# Patient Record
Sex: FEMALE | Race: WHITE | NOT HISPANIC OR LATINO
[De-identification: names, ages, dates, MRNs, and addresses within clinical notes are randomized per-mention and may not be internally consistent; named-entity substitution may affect disease eponyms.]

---

## 2017-02-08 ENCOUNTER — APPOINTMENT (OUTPATIENT)
Dept: INTERNAL MEDICINE | Facility: CLINIC | Age: 62
End: 2017-02-08

## 2017-02-08 ENCOUNTER — LABORATORY RESULT (OUTPATIENT)
Age: 62
End: 2017-02-08

## 2017-02-08 VITALS
TEMPERATURE: 97.8 F | HEART RATE: 63 BPM | WEIGHT: 95 LBS | BODY MASS INDEX: 18.65 KG/M2 | HEIGHT: 60 IN | SYSTOLIC BLOOD PRESSURE: 140 MMHG | OXYGEN SATURATION: 99 % | DIASTOLIC BLOOD PRESSURE: 88 MMHG

## 2017-02-08 DIAGNOSIS — A04.7 ENTEROCOLITIS DUE TO CLOSTRIDIUM DIFFICILE: ICD-10-CM

## 2017-02-08 RX ORDER — BACITRACIN 500 [USP'U]/G
500 OINTMENT OPHTHALMIC
Qty: 4 | Refills: 0 | Status: COMPLETED | COMMUNITY
Start: 2017-01-04

## 2017-02-08 RX ORDER — OFLOXACIN 3 MG/ML
0.3 SOLUTION/ DROPS OPHTHALMIC
Qty: 5 | Refills: 0 | Status: COMPLETED | COMMUNITY
Start: 2016-12-30

## 2017-02-08 RX ORDER — TOBRAMYCIN AND DEXAMETHASONE 3; 1 MG/ML; MG/ML
0.3-0.1 SUSPENSION/ DROPS OPHTHALMIC
Qty: 5 | Refills: 0 | Status: COMPLETED | COMMUNITY
Start: 2017-01-04

## 2017-02-11 ENCOUNTER — NON-APPOINTMENT (OUTPATIENT)
Age: 62
End: 2017-02-11

## 2017-02-11 PROBLEM — A04.7 CLOSTRIDIUM DIFFICILE COLITIS: Status: RESOLVED | Noted: 2017-02-11 | Resolved: 2017-02-11

## 2017-02-11 LAB
ALBUMIN SERPL ELPH-MCNC: 4.4 G/DL
ALP BLD-CCNC: 51 U/L
ALT SERPL-CCNC: 22 U/L
ANION GAP SERPL CALC-SCNC: 14 MMOL/L
AST SERPL-CCNC: 26 U/L
BASOPHILS # BLD AUTO: 0.04 K/UL
BASOPHILS NFR BLD AUTO: 0.9 %
BILIRUB SERPL-MCNC: 0.3 MG/DL
BUN SERPL-MCNC: 16 MG/DL
CALCIUM SERPL-MCNC: 9.6 MG/DL
CHLORIDE SERPL-SCNC: 101 MMOL/L
CO2 SERPL-SCNC: 25 MMOL/L
CREAT SERPL-MCNC: 0.81 MG/DL
EOSINOPHIL # BLD AUTO: 0.08 K/UL
EOSINOPHIL NFR BLD AUTO: 1.8 %
GLUCOSE SERPL-MCNC: 68 MG/DL
HCT VFR BLD CALC: 44.9 %
HGB BLD-MCNC: 14 G/DL
LYMPHOCYTES # BLD AUTO: 1.21 K/UL
LYMPHOCYTES NFR BLD AUTO: 28.2 %
MAN DIFF?: NORMAL
MCHC RBC-ENTMCNC: 31.2 GM/DL
MCHC RBC-ENTMCNC: 33 PG
MCV RBC AUTO: 105.9 FL
MONOCYTES # BLD AUTO: 0.31 K/UL
MONOCYTES NFR BLD AUTO: 7.3 %
NEUTROPHILS # BLD AUTO: 2.58 K/UL
NEUTROPHILS NFR BLD AUTO: 58.2 %
PLATELET # BLD AUTO: 269 K/UL
POTASSIUM SERPL-SCNC: 4.2 MMOL/L
PROT SERPL-MCNC: 7 G/DL
RBC # BLD: 4.24 M/UL
RBC # FLD: 14 %
SODIUM SERPL-SCNC: 140 MMOL/L
WBC # FLD AUTO: 4.3 K/UL

## 2017-02-13 ENCOUNTER — TRANSCRIPTION ENCOUNTER (OUTPATIENT)
Age: 62
End: 2017-02-13

## 2017-03-20 ENCOUNTER — MEDICATION RENEWAL (OUTPATIENT)
Age: 62
End: 2017-03-20

## 2017-03-26 ENCOUNTER — FORM ENCOUNTER (OUTPATIENT)
Age: 62
End: 2017-03-26

## 2017-03-27 ENCOUNTER — APPOINTMENT (OUTPATIENT)
Dept: MRI IMAGING | Facility: IMAGING CENTER | Age: 62
End: 2017-03-27

## 2017-03-27 ENCOUNTER — OUTPATIENT (OUTPATIENT)
Dept: OUTPATIENT SERVICES | Facility: HOSPITAL | Age: 62
LOS: 1 days | End: 2017-03-27
Payer: COMMERCIAL

## 2017-03-27 DIAGNOSIS — C50.919 MALIGNANT NEOPLASM OF UNSPECIFIED SITE OF UNSPECIFIED FEMALE BREAST: ICD-10-CM

## 2017-03-27 PROCEDURE — C8908: CPT

## 2017-03-27 PROCEDURE — 82565 ASSAY OF CREATININE: CPT

## 2017-03-27 PROCEDURE — A9585: CPT

## 2017-03-27 PROCEDURE — C8937: CPT

## 2017-04-18 ENCOUNTER — RESULT REVIEW (OUTPATIENT)
Age: 62
End: 2017-04-18

## 2017-06-23 ENCOUNTER — MEDICATION RENEWAL (OUTPATIENT)
Age: 62
End: 2017-06-23

## 2017-06-25 ENCOUNTER — RX RENEWAL (OUTPATIENT)
Age: 62
End: 2017-06-25

## 2017-08-02 ENCOUNTER — APPOINTMENT (OUTPATIENT)
Dept: INTERNAL MEDICINE | Facility: CLINIC | Age: 62
End: 2017-08-02
Payer: COMMERCIAL

## 2017-08-02 VITALS
DIASTOLIC BLOOD PRESSURE: 90 MMHG | BODY MASS INDEX: 18.65 KG/M2 | HEART RATE: 75 BPM | TEMPERATURE: 98 F | HEIGHT: 60 IN | OXYGEN SATURATION: 100 % | WEIGHT: 95 LBS | SYSTOLIC BLOOD PRESSURE: 130 MMHG

## 2017-08-02 PROCEDURE — 99213 OFFICE O/P EST LOW 20 MIN: CPT | Mod: 25

## 2017-08-02 PROCEDURE — 36415 COLL VENOUS BLD VENIPUNCTURE: CPT

## 2017-08-03 LAB
ALBUMIN SERPL ELPH-MCNC: 4.8 G/DL
ALP BLD-CCNC: 60 U/L
ALT SERPL-CCNC: 23 U/L
ANION GAP SERPL CALC-SCNC: 17 MMOL/L
AST SERPL-CCNC: 27 U/L
BASOPHILS # BLD AUTO: 0.03 K/UL
BASOPHILS NFR BLD AUTO: 0.7 %
BILIRUB SERPL-MCNC: 0.4 MG/DL
BUN SERPL-MCNC: 17 MG/DL
CALCIUM SERPL-MCNC: 10.3 MG/DL
CHLORIDE SERPL-SCNC: 103 MMOL/L
CO2 SERPL-SCNC: 25 MMOL/L
CREAT SERPL-MCNC: 0.86 MG/DL
EOSINOPHIL # BLD AUTO: 0.04 K/UL
EOSINOPHIL NFR BLD AUTO: 1 %
GLUCOSE SERPL-MCNC: 91 MG/DL
HBA1C MFR BLD HPLC: 5.6 %
HCT VFR BLD CALC: 44 %
HGB BLD-MCNC: 14.3 G/DL
IMM GRANULOCYTES NFR BLD AUTO: 0 %
LYMPHOCYTES # BLD AUTO: 1.14 K/UL
LYMPHOCYTES NFR BLD AUTO: 28 %
MAN DIFF?: NORMAL
MCHC RBC-ENTMCNC: 32.5 GM/DL
MCHC RBC-ENTMCNC: 33 PG
MCV RBC AUTO: 101.6 FL
MONOCYTES # BLD AUTO: 0.32 K/UL
MONOCYTES NFR BLD AUTO: 7.9 %
NEUTROPHILS # BLD AUTO: 2.54 K/UL
NEUTROPHILS NFR BLD AUTO: 62.4 %
PLATELET # BLD AUTO: 271 K/UL
POTASSIUM SERPL-SCNC: 4.2 MMOL/L
PROT SERPL-MCNC: 7.6 G/DL
RBC # BLD: 4.33 M/UL
RBC # FLD: 13.8 %
SODIUM SERPL-SCNC: 145 MMOL/L
T4 FREE SERPL-MCNC: 1.1 NG/DL
TSH SERPL-ACNC: 1.75 UIU/ML
VIT B12 SERPL-MCNC: 796 PG/ML
WBC # FLD AUTO: 4.07 K/UL

## 2017-10-29 ENCOUNTER — RX RENEWAL (OUTPATIENT)
Age: 62
End: 2017-10-29

## 2017-11-06 ENCOUNTER — OUTPATIENT (OUTPATIENT)
Dept: OUTPATIENT SERVICES | Facility: HOSPITAL | Age: 62
LOS: 1 days | Discharge: HOME | End: 2017-11-06

## 2017-11-06 DIAGNOSIS — C50.919 MALIGNANT NEOPLASM OF UNSPECIFIED SITE OF UNSPECIFIED FEMALE BREAST: ICD-10-CM

## 2017-11-08 ENCOUNTER — RESULT REVIEW (OUTPATIENT)
Age: 62
End: 2017-11-08

## 2017-11-08 ENCOUNTER — APPOINTMENT (OUTPATIENT)
Dept: HEMATOLOGY ONCOLOGY | Facility: CLINIC | Age: 62
End: 2017-11-08
Payer: COMMERCIAL

## 2017-11-08 ENCOUNTER — APPOINTMENT (OUTPATIENT)
Dept: INFUSION THERAPY | Facility: HOSPITAL | Age: 62
End: 2017-11-08
Payer: COMMERCIAL

## 2017-11-08 VITALS
HEART RATE: 68 BPM | DIASTOLIC BLOOD PRESSURE: 88 MMHG | WEIGHT: 94.8 LBS | TEMPERATURE: 97.4 F | RESPIRATION RATE: 16 BRPM | OXYGEN SATURATION: 95 % | SYSTOLIC BLOOD PRESSURE: 144 MMHG | BODY MASS INDEX: 18.51 KG/M2

## 2017-11-08 LAB
BASOPHILS # BLD AUTO: 0.1 K/UL — SIGNIFICANT CHANGE UP (ref 0–0.2)
BASOPHILS NFR BLD AUTO: 1.6 % — SIGNIFICANT CHANGE UP (ref 0–2)
EOSINOPHIL # BLD AUTO: 0.1 K/UL — SIGNIFICANT CHANGE UP (ref 0–0.5)
EOSINOPHIL NFR BLD AUTO: 1.3 % — SIGNIFICANT CHANGE UP (ref 0–6)
HCT VFR BLD CALC: 44.7 % — SIGNIFICANT CHANGE UP (ref 34.5–45)
HGB BLD-MCNC: 15.1 G/DL — SIGNIFICANT CHANGE UP (ref 11.5–15.5)
LYMPHOCYTES # BLD AUTO: 1.5 K/UL — SIGNIFICANT CHANGE UP (ref 1–3.3)
LYMPHOCYTES # BLD AUTO: 30.8 % — SIGNIFICANT CHANGE UP (ref 13–44)
MCHC RBC-ENTMCNC: 33.8 G/DL — SIGNIFICANT CHANGE UP (ref 32–36)
MCHC RBC-ENTMCNC: 34.5 PG — HIGH (ref 27–34)
MCV RBC AUTO: 102 FL — HIGH (ref 80–100)
MONOCYTES # BLD AUTO: 0.3 K/UL — SIGNIFICANT CHANGE UP (ref 0–0.9)
MONOCYTES NFR BLD AUTO: 6.2 % — SIGNIFICANT CHANGE UP (ref 2–14)
NEUTROPHILS # BLD AUTO: 2.9 K/UL — SIGNIFICANT CHANGE UP (ref 1.8–7.4)
NEUTROPHILS NFR BLD AUTO: 60.2 % — SIGNIFICANT CHANGE UP (ref 43–77)
PLATELET # BLD AUTO: 232 K/UL — SIGNIFICANT CHANGE UP (ref 150–400)
RBC # BLD: 4.39 M/UL — SIGNIFICANT CHANGE UP (ref 3.8–5.2)
RBC # FLD: 11.6 % — SIGNIFICANT CHANGE UP (ref 10.3–14.5)
WBC # BLD: 4.8 K/UL — SIGNIFICANT CHANGE UP (ref 3.8–10.5)
WBC # FLD AUTO: 4.8 K/UL — SIGNIFICANT CHANGE UP (ref 3.8–10.5)

## 2017-11-08 PROCEDURE — G0008: CPT

## 2017-11-08 PROCEDURE — 99214 OFFICE O/P EST MOD 30 MIN: CPT

## 2017-11-24 ENCOUNTER — APPOINTMENT (OUTPATIENT)
Dept: INTERNAL MEDICINE | Facility: CLINIC | Age: 62
End: 2017-11-24
Payer: COMMERCIAL

## 2017-11-24 ENCOUNTER — NON-APPOINTMENT (OUTPATIENT)
Age: 62
End: 2017-11-24

## 2017-11-24 VITALS
BODY MASS INDEX: 18.65 KG/M2 | WEIGHT: 95 LBS | DIASTOLIC BLOOD PRESSURE: 84 MMHG | HEART RATE: 73 BPM | OXYGEN SATURATION: 99 % | SYSTOLIC BLOOD PRESSURE: 98 MMHG | HEIGHT: 60 IN | TEMPERATURE: 97.9 F

## 2017-11-24 VITALS — SYSTOLIC BLOOD PRESSURE: 126 MMHG | DIASTOLIC BLOOD PRESSURE: 84 MMHG

## 2017-11-24 DIAGNOSIS — R51 HEADACHE: ICD-10-CM

## 2017-11-24 DIAGNOSIS — Z12.11 ENCOUNTER FOR SCREENING FOR MALIGNANT NEOPLASM OF COLON: ICD-10-CM

## 2017-11-24 DIAGNOSIS — Z87.898 PERSONAL HISTORY OF OTHER SPECIFIED CONDITIONS: ICD-10-CM

## 2017-11-24 PROCEDURE — 99396 PREV VISIT EST AGE 40-64: CPT | Mod: 25

## 2017-11-24 PROCEDURE — 36415 COLL VENOUS BLD VENIPUNCTURE: CPT

## 2017-11-24 PROCEDURE — 93000 ELECTROCARDIOGRAM COMPLETE: CPT

## 2017-11-24 RX ORDER — HYDROCORTISONE BUTYRATE 1 MG/G
0.1 CREAM TOPICAL
Qty: 15 | Refills: 0 | Status: DISCONTINUED | COMMUNITY
Start: 2017-11-07

## 2017-11-26 LAB
CHOLEST SERPL-MCNC: 249 MG/DL
CHOLEST/HDLC SERPL: 2.1 RATIO
HBA1C MFR BLD HPLC: 5.5 %
HCV AB SER QL: NONREACTIVE
HCV S/CO RATIO: 0.09 S/CO
HDLC SERPL-MCNC: 118 MG/DL
LDLC SERPL CALC-MCNC: 123 MG/DL
MAGNESIUM SERPL-MCNC: 2.1 MG/DL
T3FREE SERPL-MCNC: 2.3 PG/ML
T4 FREE SERPL-MCNC: 1 NG/DL
TRIGL SERPL-MCNC: 39 MG/DL
TSH SERPL-ACNC: 1.5 UIU/ML

## 2018-03-02 ENCOUNTER — APPOINTMENT (OUTPATIENT)
Dept: ORTHOPEDIC SURGERY | Facility: CLINIC | Age: 63
End: 2018-03-02
Payer: COMMERCIAL

## 2018-03-02 VITALS
HEART RATE: 72 BPM | SYSTOLIC BLOOD PRESSURE: 145 MMHG | HEIGHT: 60.5 IN | WEIGHT: 95 LBS | BODY MASS INDEX: 18.17 KG/M2 | DIASTOLIC BLOOD PRESSURE: 88 MMHG

## 2018-03-02 DIAGNOSIS — J18.9 PNEUMONIA, UNSPECIFIED ORGANISM: ICD-10-CM

## 2018-03-02 DIAGNOSIS — Z78.9 OTHER SPECIFIED HEALTH STATUS: ICD-10-CM

## 2018-03-02 PROCEDURE — 72100 X-RAY EXAM L-S SPINE 2/3 VWS: CPT

## 2018-03-02 PROCEDURE — 99204 OFFICE O/P NEW MOD 45 MIN: CPT

## 2018-03-05 PROBLEM — J18.9 COMMUNITY ACQUIRED PNEUMONIA: Status: RESOLVED | Noted: 2018-03-05 | Resolved: 2018-03-05

## 2018-03-05 PROBLEM — Z78.9 ALCOHOL INGESTION: Status: ACTIVE | Noted: 2018-03-05

## 2018-04-04 ENCOUNTER — APPOINTMENT (OUTPATIENT)
Dept: INTERNAL MEDICINE | Facility: CLINIC | Age: 63
End: 2018-04-04
Payer: COMMERCIAL

## 2018-04-04 ENCOUNTER — NON-APPOINTMENT (OUTPATIENT)
Age: 63
End: 2018-04-04

## 2018-04-04 VITALS
HEIGHT: 60.5 IN | TEMPERATURE: 97.5 F | SYSTOLIC BLOOD PRESSURE: 120 MMHG | WEIGHT: 95 LBS | OXYGEN SATURATION: 96 % | HEART RATE: 62 BPM | BODY MASS INDEX: 18.17 KG/M2 | DIASTOLIC BLOOD PRESSURE: 80 MMHG

## 2018-04-04 VITALS — SYSTOLIC BLOOD PRESSURE: 140 MMHG | DIASTOLIC BLOOD PRESSURE: 90 MMHG

## 2018-04-04 DIAGNOSIS — D75.89 OTHER SPECIFIED DISEASES OF BLOOD AND BLOOD-FORMING ORGANS: ICD-10-CM

## 2018-04-04 PROCEDURE — 93000 ELECTROCARDIOGRAM COMPLETE: CPT

## 2018-04-04 PROCEDURE — 99243 OFF/OP CNSLTJ NEW/EST LOW 30: CPT | Mod: 25

## 2018-04-04 PROCEDURE — 36415 COLL VENOUS BLD VENIPUNCTURE: CPT

## 2018-04-04 RX ORDER — MELOXICAM 15 MG/1
15 TABLET ORAL
Qty: 30 | Refills: 0 | Status: DISCONTINUED | COMMUNITY
Start: 2018-03-02 | End: 2018-04-04

## 2018-04-06 LAB
ALBUMIN SERPL ELPH-MCNC: 4.6 G/DL
ANION GAP SERPL CALC-SCNC: 14 MMOL/L
BACTERIA UR CULT: NORMAL
BASOPHILS # BLD AUTO: 0.03 K/UL
BASOPHILS NFR BLD AUTO: 0.8 %
BUN SERPL-MCNC: 15 MG/DL
CALCIUM SERPL-MCNC: 9.9 MG/DL
CHLORIDE SERPL-SCNC: 105 MMOL/L
CO2 SERPL-SCNC: 25 MMOL/L
CREAT SERPL-MCNC: 0.85 MG/DL
EOSINOPHIL # BLD AUTO: 0.06 K/UL
EOSINOPHIL NFR BLD AUTO: 1.6 %
GLUCOSE SERPL-MCNC: 71 MG/DL
HCT VFR BLD CALC: 43.2 %
HGB BLD-MCNC: 13.8 G/DL
IMM GRANULOCYTES NFR BLD AUTO: 0.3 %
LYMPHOCYTES # BLD AUTO: 1.09 K/UL
LYMPHOCYTES NFR BLD AUTO: 29.4 %
MAN DIFF?: NORMAL
MCHC RBC-ENTMCNC: 31.9 GM/DL
MCHC RBC-ENTMCNC: 33.1 PG
MCV RBC AUTO: 103.6 FL
MONOCYTES # BLD AUTO: 0.26 K/UL
MONOCYTES NFR BLD AUTO: 7 %
NEUTROPHILS # BLD AUTO: 2.26 K/UL
NEUTROPHILS NFR BLD AUTO: 60.9 %
PHOSPHATE SERPL-MCNC: 3.6 MG/DL
PLATELET # BLD AUTO: 271 K/UL
POTASSIUM SERPL-SCNC: 4.2 MMOL/L
RBC # BLD: 4.17 M/UL
RBC # FLD: 14.3 %
SODIUM SERPL-SCNC: 144 MMOL/L
WBC # FLD AUTO: 3.71 K/UL

## 2018-06-21 ENCOUNTER — RESULT REVIEW (OUTPATIENT)
Age: 63
End: 2018-06-21

## 2018-08-10 ENCOUNTER — MEDICATION RENEWAL (OUTPATIENT)
Age: 63
End: 2018-08-10

## 2018-08-26 ENCOUNTER — TRANSCRIPTION ENCOUNTER (OUTPATIENT)
Age: 63
End: 2018-08-26

## 2018-09-06 ENCOUNTER — RX RENEWAL (OUTPATIENT)
Age: 63
End: 2018-09-06

## 2018-09-18 ENCOUNTER — OUTPATIENT (OUTPATIENT)
Dept: OUTPATIENT SERVICES | Facility: HOSPITAL | Age: 63
LOS: 1 days | Discharge: HOME | End: 2018-09-18

## 2018-09-18 DIAGNOSIS — C50.919 MALIGNANT NEOPLASM OF UNSPECIFIED SITE OF UNSPECIFIED FEMALE BREAST: ICD-10-CM

## 2018-10-09 ENCOUNTER — APPOINTMENT (OUTPATIENT)
Dept: HEMATOLOGY ONCOLOGY | Facility: CLINIC | Age: 63
End: 2018-10-09

## 2018-10-12 ENCOUNTER — CHART COPY (OUTPATIENT)
Age: 63
End: 2018-10-12

## 2018-10-16 ENCOUNTER — APPOINTMENT (OUTPATIENT)
Dept: HEMATOLOGY ONCOLOGY | Facility: CLINIC | Age: 63
End: 2018-10-16

## 2018-10-16 ENCOUNTER — RESULT REVIEW (OUTPATIENT)
Age: 63
End: 2018-10-16

## 2018-10-16 LAB
BASOPHILS # BLD AUTO: 0.1 K/UL — SIGNIFICANT CHANGE UP (ref 0–0.2)
BASOPHILS NFR BLD AUTO: 1 % — SIGNIFICANT CHANGE UP (ref 0–2)
EOSINOPHIL # BLD AUTO: 0.1 K/UL — SIGNIFICANT CHANGE UP (ref 0–0.5)
EOSINOPHIL NFR BLD AUTO: 2.2 % — SIGNIFICANT CHANGE UP (ref 0–6)
HCT VFR BLD CALC: 43.7 % — SIGNIFICANT CHANGE UP (ref 34.5–45)
HGB BLD-MCNC: 14.6 G/DL — SIGNIFICANT CHANGE UP (ref 11.5–15.5)
LYMPHOCYTES # BLD AUTO: 1.6 K/UL — SIGNIFICANT CHANGE UP (ref 1–3.3)
LYMPHOCYTES # BLD AUTO: 30.7 % — SIGNIFICANT CHANGE UP (ref 13–44)
MCHC RBC-ENTMCNC: 33.4 G/DL — SIGNIFICANT CHANGE UP (ref 32–36)
MCHC RBC-ENTMCNC: 33.7 PG — SIGNIFICANT CHANGE UP (ref 27–34)
MCV RBC AUTO: 101 FL — HIGH (ref 80–100)
MONOCYTES # BLD AUTO: 0.3 K/UL — SIGNIFICANT CHANGE UP (ref 0–0.9)
MONOCYTES NFR BLD AUTO: 6.2 % — SIGNIFICANT CHANGE UP (ref 2–14)
NEUTROPHILS # BLD AUTO: 3.2 K/UL — SIGNIFICANT CHANGE UP (ref 1.8–7.4)
NEUTROPHILS NFR BLD AUTO: 59.9 % — SIGNIFICANT CHANGE UP (ref 43–77)
PLATELET # BLD AUTO: 256 K/UL — SIGNIFICANT CHANGE UP (ref 150–400)
RBC # BLD: 4.32 M/UL — SIGNIFICANT CHANGE UP (ref 3.8–5.2)
RBC # FLD: 11.6 % — SIGNIFICANT CHANGE UP (ref 10.3–14.5)
WBC # BLD: 5.3 K/UL — SIGNIFICANT CHANGE UP (ref 3.8–10.5)
WBC # FLD AUTO: 5.3 K/UL — SIGNIFICANT CHANGE UP (ref 3.8–10.5)

## 2018-10-17 ENCOUNTER — OUTPATIENT (OUTPATIENT)
Dept: OUTPATIENT SERVICES | Facility: HOSPITAL | Age: 63
LOS: 1 days | Discharge: HOME | End: 2018-10-17

## 2018-10-17 DIAGNOSIS — C50.919 MALIGNANT NEOPLASM OF UNSPECIFIED SITE OF UNSPECIFIED FEMALE BREAST: ICD-10-CM

## 2018-10-22 ENCOUNTER — APPOINTMENT (OUTPATIENT)
Dept: HEMATOLOGY ONCOLOGY | Facility: CLINIC | Age: 63
End: 2018-10-22
Payer: COMMERCIAL

## 2018-10-22 VITALS
HEART RATE: 72 BPM | BODY MASS INDEX: 18.25 KG/M2 | WEIGHT: 95 LBS | OXYGEN SATURATION: 100 % | SYSTOLIC BLOOD PRESSURE: 151 MMHG | TEMPERATURE: 97.8 F | DIASTOLIC BLOOD PRESSURE: 88 MMHG | RESPIRATION RATE: 16 BRPM

## 2018-10-22 PROCEDURE — 99214 OFFICE O/P EST MOD 30 MIN: CPT

## 2018-10-23 LAB — HBA1C MFR BLD HPLC: 5.7 %

## 2018-12-13 LAB
24R-OH-CALCIDIOL SERPL-MCNC: 65.5 PG/ML
ALBUMIN SERPL ELPH-MCNC: 4.7 G/DL
ALBUMIN SERPL ELPH-MCNC: 4.8 G/DL
ALP BLD-CCNC: 57 U/L
ALP BLD-CCNC: 59 U/L
ALT SERPL-CCNC: 21 U/L
ALT SERPL-CCNC: 25 U/L
ANION GAP SERPL CALC-SCNC: 14 MMOL/L
ANION GAP SERPL CALC-SCNC: 15 MMOL/L
APPEARANCE: CLEAR
AST SERPL-CCNC: 24 U/L
AST SERPL-CCNC: 26 U/L
BACTERIA UR CULT: ABNORMAL
BACTERIA: NEGATIVE
BILIRUB SERPL-MCNC: 0.2 MG/DL
BILIRUB SERPL-MCNC: 0.5 MG/DL
BILIRUBIN URINE: NEGATIVE
BLOOD URINE: NEGATIVE
BUN SERPL-MCNC: 16 MG/DL
BUN SERPL-MCNC: 24 MG/DL
CALCIUM SERPL-MCNC: 9.9 MG/DL
CALCIUM SERPL-MCNC: 9.9 MG/DL
CANCER AG125 SERPL-ACNC: 10 U/ML
CANCER AG27-29 SERPL-ACNC: 25.8 U/ML
CANCER AG27-29 SERPL-ACNC: 27 U/ML
CHLORIDE SERPL-SCNC: 101 MMOL/L
CHLORIDE SERPL-SCNC: 103 MMOL/L
CHOLEST SERPL-MCNC: 265 MG/DL
CHOLEST/HDLC SERPL: 2.3 RATIO
CO2 SERPL-SCNC: 25 MMOL/L
CO2 SERPL-SCNC: 27 MMOL/L
COLOR: YELLOW
CREAT SERPL-MCNC: 0.74 MG/DL
CREAT SERPL-MCNC: 0.92 MG/DL
GLUCOSE QUALITATIVE U: NEGATIVE MG/DL
GLUCOSE SERPL-MCNC: 80 MG/DL
GLUCOSE SERPL-MCNC: 96 MG/DL
HDLC SERPL-MCNC: 115 MG/DL
HYALINE CASTS: 0 /LPF
KETONES URINE: NEGATIVE
LDLC SERPL CALC-MCNC: 138 MG/DL
LEUKOCYTE ESTERASE URINE: NEGATIVE
MICROSCOPIC-UA: NORMAL
NITRITE URINE: NEGATIVE
PH URINE: 6.5
POTASSIUM SERPL-SCNC: 3.8 MMOL/L
POTASSIUM SERPL-SCNC: 4.4 MMOL/L
PROT SERPL-MCNC: 6.8 G/DL
PROT SERPL-MCNC: 7.2 G/DL
PROTEIN URINE: NEGATIVE MG/DL
RED BLOOD CELLS URINE: 2 /HPF
SODIUM SERPL-SCNC: 140 MMOL/L
SODIUM SERPL-SCNC: 145 MMOL/L
SPECIFIC GRAVITY URINE: 1.01
SQUAMOUS EPITHELIAL CELLS: 2 /HPF
TRIGL SERPL-MCNC: 59 MG/DL
UROBILINOGEN URINE: NEGATIVE MG/DL
WHITE BLOOD CELLS URINE: 2 /HPF

## 2019-01-23 ENCOUNTER — APPOINTMENT (OUTPATIENT)
Dept: HEMATOLOGY ONCOLOGY | Facility: CLINIC | Age: 64
End: 2019-01-23

## 2019-02-26 ENCOUNTER — FORM ENCOUNTER (OUTPATIENT)
Age: 64
End: 2019-02-26

## 2019-02-27 ENCOUNTER — OUTPATIENT (OUTPATIENT)
Dept: OUTPATIENT SERVICES | Facility: HOSPITAL | Age: 64
LOS: 1 days | End: 2019-02-27
Payer: COMMERCIAL

## 2019-02-27 ENCOUNTER — APPOINTMENT (OUTPATIENT)
Dept: MRI IMAGING | Facility: IMAGING CENTER | Age: 64
End: 2019-02-27
Payer: COMMERCIAL

## 2019-02-27 DIAGNOSIS — C50.919 MALIGNANT NEOPLASM OF UNSPECIFIED SITE OF UNSPECIFIED FEMALE BREAST: ICD-10-CM

## 2019-02-27 PROCEDURE — C8937: CPT

## 2019-02-27 PROCEDURE — A9585: CPT

## 2019-02-27 PROCEDURE — 77049 MRI BREAST C-+ W/CAD BI: CPT | Mod: 26

## 2019-02-27 PROCEDURE — C8908: CPT

## 2019-04-22 ENCOUNTER — NON-APPOINTMENT (OUTPATIENT)
Age: 64
End: 2019-04-22

## 2019-04-22 ENCOUNTER — APPOINTMENT (OUTPATIENT)
Dept: INTERNAL MEDICINE | Facility: CLINIC | Age: 64
End: 2019-04-22
Payer: COMMERCIAL

## 2019-04-22 VITALS — SYSTOLIC BLOOD PRESSURE: 140 MMHG | HEART RATE: 67 BPM | DIASTOLIC BLOOD PRESSURE: 76 MMHG | OXYGEN SATURATION: 99 %

## 2019-04-22 DIAGNOSIS — M54.41 LUMBAGO WITH SCIATICA, RIGHT SIDE: ICD-10-CM

## 2019-04-22 DIAGNOSIS — S33.5XXA SPRAIN OF LIGAMENTS OF LUMBAR SPINE, INITIAL ENCOUNTER: ICD-10-CM

## 2019-04-22 DIAGNOSIS — K21.9 GASTRO-ESOPHAGEAL REFLUX DISEASE W/OUT ESOPHAGITIS: ICD-10-CM

## 2019-04-22 DIAGNOSIS — K59.09 OTHER CONSTIPATION: ICD-10-CM

## 2019-04-22 DIAGNOSIS — R01.1 CARDIAC MURMUR, UNSPECIFIED: ICD-10-CM

## 2019-04-22 DIAGNOSIS — M54.16 RADICULOPATHY, LUMBAR REGION: ICD-10-CM

## 2019-04-22 DIAGNOSIS — Z11.59 ENCOUNTER FOR SCREENING FOR OTHER VIRAL DISEASES: ICD-10-CM

## 2019-04-22 LAB
APPEARANCE: CLEAR
BACTERIA: NEGATIVE
BILIRUBIN URINE: NEGATIVE
BLOOD URINE: NEGATIVE
COLOR: NORMAL
GLUCOSE QUALITATIVE U: NEGATIVE
HYALINE CASTS: 0 /LPF
KETONES URINE: NEGATIVE
LEUKOCYTE ESTERASE URINE: NEGATIVE
MICROSCOPIC-UA: NORMAL
NITRITE URINE: NEGATIVE
PH URINE: 7
PROTEIN URINE: NEGATIVE
RED BLOOD CELLS URINE: 0 /HPF
SPECIFIC GRAVITY URINE: 1.01
SQUAMOUS EPITHELIAL CELLS: 1 /HPF
UROBILINOGEN URINE: NORMAL
WHITE BLOOD CELLS URINE: 1 /HPF

## 2019-04-22 PROCEDURE — 93000 ELECTROCARDIOGRAM COMPLETE: CPT

## 2019-04-22 PROCEDURE — 99396 PREV VISIT EST AGE 40-64: CPT | Mod: 25

## 2019-04-23 PROBLEM — Z11.59 NEED FOR HEPATITIS C SCREENING TEST: Status: RESOLVED | Noted: 2017-11-24 | Resolved: 2019-04-23

## 2019-04-23 PROBLEM — M54.16 LUMBAR RADICULOPATHY: Status: RESOLVED | Noted: 2018-03-02 | Resolved: 2019-04-23

## 2019-04-23 PROBLEM — S33.5XXA LUMBAR SPRAIN, INITIAL ENCOUNTER: Status: RESOLVED | Noted: 2018-03-02 | Resolved: 2019-04-23

## 2019-04-23 NOTE — HEALTH RISK ASSESSMENT
[Patient declined mammogram] : Patient declined mammogram [Patient reported PAP Smear was normal] : Patient reported PAP Smear was normal [Patient declined bone density test] : Patient declined bone density test [None] : None [With Family] : lives with family [Employed] : employed [College] : College [] :  [# Of Children ___] : has [unfilled] children [Sexually Active] : sexually active [Feels Safe at Home] : Feels safe at home [Fully functional (bathing, dressing, toileting, transferring, walking, feeding)] : Fully functional (bathing, dressing, toileting, transferring, walking, feeding) [Smoke Detector] : smoke detector [Carbon Monoxide Detector] : carbon monoxide detector [Seat Belt] :  uses seat belt [Sunscreen] : uses sunscreen [No falls in past year] : Patient reported no falls in the past year [0] : 2) Feeling down, depressed, or hopeless: Not at all (0) [Fully functional (using the telephone, shopping, preparing meals, housekeeping, doing laundry, using] : Fully functional and needs no help or supervision to perform IADLs (using the telephone, shopping, preparing meals, housekeeping, doing laundry, using transportation, managing medications and managing finances) [Reports normal functional visual acuity (ie: able to read med bottle)] : Reports normal functional visual acuity [] : No [de-identified] : wine 5 days a week [de-identified] : 5days a week bar class [de-identified] : eats a healthy diet With adequate calcium [[id number]] : 0 [Language] : denies difficulty with language [Change in mental status noted] : No change in mental status noted [Behavior] : denies difficulty with behavior [Learning/Retaining New Information] : denies difficulty learning/retaining new information [Handling Complex Tasks] : denies difficulty handling complex tasks [Reasoning] : denies difficulty with reasoning [Spatial Ability and Orientation] : denies difficulty with spatial ability and orientation [Reports changes in vision] : Reports no changes in vision [High Risk Behavior] : no high risk behavior [Reports changes in hearing] : Reports no changes in hearing [Reports changes in dental health] : Reports no changes in dental health [Guns at Home] : no guns at home [PapSmearDate] : 2018 [MammogramComments] : bilateral mastectomy [ColonoscopyComments] : no polyps [ColonoscopyDate] : 8/2012 [HepatitisCDate] : 11/2017 [HepatitisCComments] : nonreactive

## 2019-04-23 NOTE — PHYSICAL EXAM
[No Acute Distress] : no acute distress [Well Nourished] : well nourished [Well Developed] : well developed [Well-Appearing] : well-appearing [Normal Voice/Communication] : normal voice/communication [Normal Sclera/Conjunctiva] : normal sclera/conjunctiva [PERRL] : pupils equal round and reactive to light [EOMI] : extraocular movements intact [Normal Outer Ear/Nose] : the outer ears and nose were normal in appearance [Normal Oropharynx] : the oropharynx was normal [Normal TMs] : both tympanic membranes were normal [No JVD] : no jugular venous distention [Supple] : supple [No Lymphadenopathy] : no lymphadenopathy [Thyroid Normal, No Nodules] : the thyroid was normal and there were no nodules present [No Respiratory Distress] : no respiratory distress  [Clear to Auscultation] : lungs were clear to auscultation bilaterally [No Accessory Muscle Use] : no accessory muscle use [Normal Percussion] : the chest was normal to percussion [Normal Rate] : normal rate  [Normal S1, S2] : normal S1 and S2 [Regular Rhythm] : with a regular rhythm [No Murmur] : no murmur heard [No Carotid Bruits] : no carotid bruits [No Abdominal Bruit] : a ~M bruit was not heard ~T in the abdomen [No Varicosities] : no varicosities [Pedal Pulses Present] : the pedal pulses are present [No Edema] : there was no peripheral edema [No Palpable Aorta] : no palpable aorta [No Extremity Clubbing/Cyanosis] : no extremity clubbing/cyanosis [Soft] : abdomen soft [Non Tender] : non-tender [Non-distended] : non-distended [No Masses] : no abdominal mass palpated [No HSM] : no HSM [Normal Bowel Sounds] : normal bowel sounds [Normal Axillary Nodes] : no axillary lymphadenopathy [Normal Supraclavicular Nodes] : no supraclavicular lymphadenopathy [Normal Posterior Cervical Nodes] : no posterior cervical lymphadenopathy [Normal Anterior Cervical Nodes] : no anterior cervical lymphadenopathy [Normal Inguinal Nodes] : no inguinal lymphadenopathy [No CVA Tenderness] : no CVA  tenderness [No Spinal Tenderness] : no spinal tenderness [No Joint Swelling] : no joint swelling [Grossly Normal Strength/Tone] : grossly normal strength/tone [No Rash] : no rash [No Skin Lesions] : no skin lesions [Normal Gait] : normal gait [Coordination Grossly Intact] : coordination grossly intact [Speech Grossly Normal] : speech grossly normal [No Focal Deficits] : no focal deficits [Memory Grossly Normal] : memory grossly normal [Normal Affect] : the affect was normal [Alert and Oriented x3] : oriented to person, place, and time [Normal Mood] : the mood was normal [Normal Insight/Judgement] : insight and judgment were intact [Deep Tendon Reflexes (DTR)] : deep tendon reflexes were 2+ and symmetric [Kyphosis] : no kyphosis [de-identified] : thin [Scoliosis] : no scoliosis [de-identified] : Chalazion left lateral upper eyelid and hordeum  left lower eyelid [de-identified] : Status post bilateral mastectomy with implant reconstruction [de-identified] : Harsh 3/6 systolic murmur at left lower sternal border

## 2019-04-23 NOTE — ASSESSMENT
[FreeTextEntry1] : Per patient request she will return for fasting blood work. A urinalysis was sent today.\par She will follow up  with ophthalmology and advised continuing warm soaks to her left eye for a chalazion and hordeolum. She will use her antibiotic eyedrops.\par She will followup with reconstructive breast surgery regarding her implants and cancer risk.\par She refuses to consider a bone density or any treatment for osteoporosis.  she is aware of fracture risk and consequences\par She has an impressive  systolic ejection murmur at the left lower sternal border. With her breast implants I am concerned about doing an echo and getting good results without harming implants. I have advised she see cardiology for evaluation.\par She was advised to continue hydration to prevent kidney stones and also to followup with urology\par She will do a stool FIT test pending an eventual colonoscopy when she has her other medical issues straightened out\par We discussed the Shingrix  vaccine and she will return when it is available

## 2019-04-23 NOTE — HISTORY OF PRESENT ILLNESS
[FreeTextEntry1] : CPE [de-identified] : She has had a stressful year. She was told of the possibility of her textured breast implants causing cancer and has a difficult decision as to whether to get them removed as the surgeon told her it could be issues because of prior radiation and skin issues. She has had issues with her  mother and daughter's health. She continues to work full-time. She is presently suffering with a chalazion of the left eye and saw the eye doctor Dr. Gomez  and is using warm soaks and  Tobrex drops. Now she feels she might be getting a stye  on the lower left lid.  . Her vision is fine. She is exercising regularly on the treadmill for 20 minutes and does bar  classes 5 times a week. She does this without chest pain shortness of breath palpitations.  She has no edema.  She is living with chronic constipation.  She has rare heartburn. She sees the gynecologist yearly. She has seen the dermatologist and all was fine.  she has had no issues related to kidney stones.  She tries to hydrate well.  She has been noncompliant with urology follow up.  Her back has been fine.

## 2019-04-23 NOTE — DATA REVIEWED
[FreeTextEntry1] : EKG reveals normal sinus with a diffuse T wave inversion no change compared to prior

## 2019-05-14 ENCOUNTER — APPOINTMENT (OUTPATIENT)
Dept: ORTHOPEDIC SURGERY | Facility: CLINIC | Age: 64
End: 2019-05-14
Payer: COMMERCIAL

## 2019-05-14 VITALS
HEART RATE: 79 BPM | SYSTOLIC BLOOD PRESSURE: 153 MMHG | WEIGHT: 95 LBS | BODY MASS INDEX: 17.94 KG/M2 | DIASTOLIC BLOOD PRESSURE: 97 MMHG | HEIGHT: 61 IN

## 2019-05-14 DIAGNOSIS — M47.812 SPONDYLOSIS W/OUT MYELOPATHY OR RADICULOPATHY, CERVICAL REGION: ICD-10-CM

## 2019-05-14 DIAGNOSIS — M54.2 CERVICALGIA: ICD-10-CM

## 2019-05-14 PROCEDURE — 72040 X-RAY EXAM NECK SPINE 2-3 VW: CPT

## 2019-05-14 PROCEDURE — 99214 OFFICE O/P EST MOD 30 MIN: CPT

## 2019-05-14 NOTE — REASON FOR VISIT
[Follow-Up Visit] : a follow-up visit for [Degenerative Joint Disease] : degenerative joint disease [Neck Pain] : neck pain

## 2019-05-14 NOTE — HISTORY OF PRESENT ILLNESS
[de-identified] : She has a long history of neck pain and headaches. Her neck pain at the cervicothoracic junction has increased in the last month or so. She goes to an exercise class regularly but uses only 2 pound weights. She has not had associated neurologic symptoms and there is no radiation of the pain to her arms. There have been no changes in her gait.

## 2019-05-14 NOTE — DISCUSSION/SUMMARY
[de-identified] : She has degenerative changes in the cervical spine. She will continue use moist heat. We discussed activities she needs to avoid which may aggravate his symptoms. I have again recommended meloxicam but she will not take any medication. She has not had a bone density and more than 10 years and I stressed the necessity to find out where she is at and if her bone density is abnormal it needs to be addressed. I will see her for followup on a p.r.n. basis

## 2019-05-14 NOTE — PHYSICAL EXAM
[de-identified] : She is fully alert and oriented with a normal mood and affect. She is in no acute distress. She is quite . Her lower extremity neurologic exam revealed 1-2+ symmetrical reflexes with normal motor power. With Babinski there was withdrawal. Straight leg raising is negative to 90°. Her upper extremity neurological exam revealed 1-2+ symmetrical reflexes were normal motor power and sensation. There is a loss of range of motion of the left shoulder with some pain at the extremes. There is some tenderness of the spinous processes at and below the cervicothoracic junction. [de-identified] : AP and lateral x-rays of the cervical spine reveals significant multilevel degenerative changes. There are no destructive changes. There is a mild scoliosis.

## 2019-05-17 ENCOUNTER — TRANSCRIPTION ENCOUNTER (OUTPATIENT)
Age: 64
End: 2019-05-17

## 2019-05-18 LAB — HEMOCCULT STL QL IA: NEGATIVE

## 2019-06-26 ENCOUNTER — OUTPATIENT (OUTPATIENT)
Dept: OUTPATIENT SERVICES | Facility: HOSPITAL | Age: 64
LOS: 1 days | End: 2019-06-26
Payer: COMMERCIAL

## 2019-06-26 ENCOUNTER — APPOINTMENT (OUTPATIENT)
Dept: RADIOLOGY | Facility: IMAGING CENTER | Age: 64
End: 2019-06-26
Payer: COMMERCIAL

## 2019-06-26 DIAGNOSIS — Z00.00 ENCOUNTER FOR GENERAL ADULT MEDICAL EXAMINATION WITHOUT ABNORMAL FINDINGS: ICD-10-CM

## 2019-06-26 PROCEDURE — 77080 DXA BONE DENSITY AXIAL: CPT | Mod: 26

## 2019-06-26 PROCEDURE — 77080 DXA BONE DENSITY AXIAL: CPT

## 2019-06-28 ENCOUNTER — RX RENEWAL (OUTPATIENT)
Age: 64
End: 2019-06-28

## 2019-07-12 ENCOUNTER — APPOINTMENT (OUTPATIENT)
Dept: INTERNAL MEDICINE | Facility: CLINIC | Age: 64
End: 2019-07-12
Payer: COMMERCIAL

## 2019-07-12 VITALS
SYSTOLIC BLOOD PRESSURE: 122 MMHG | BODY MASS INDEX: 18.65 KG/M2 | TEMPERATURE: 97.8 F | DIASTOLIC BLOOD PRESSURE: 80 MMHG | WEIGHT: 95 LBS | HEIGHT: 60 IN | OXYGEN SATURATION: 99 % | HEART RATE: 75 BPM

## 2019-07-12 VITALS — SYSTOLIC BLOOD PRESSURE: 130 MMHG | DIASTOLIC BLOOD PRESSURE: 80 MMHG

## 2019-07-12 DIAGNOSIS — F41.8 OTHER SPECIFIED ANXIETY DISORDERS: ICD-10-CM

## 2019-07-12 DIAGNOSIS — Z98.82 BREAST IMPLANT STATUS: ICD-10-CM

## 2019-07-12 DIAGNOSIS — R03.0 ELEVATED BLOOD-PRESSURE READING, W/OUT DIAGNOSIS OF HYPERTENSION: ICD-10-CM

## 2019-07-12 PROCEDURE — 99214 OFFICE O/P EST MOD 30 MIN: CPT

## 2019-07-12 NOTE — ASSESSMENT
[FreeTextEntry1] : Patient was reassured that her blood pressure appears to be normal here. I feel her machine is inaccurate as it is reading 20 points higher both systolic and diastolic and the cuff  she is using is too big as well. She will return here 2 or 3 times over the next month to get repeat blood pressure check. I do not think there is any evidence of pheochromocytoma.\par Regarding her osteoporosis we discussed the options. She does not appear to be a candidate for Forteo/Tymlos as she has had radiation therapy . We discussed Prolia, Reclast and  oral bisphosphonates. We discussed possible side effects.   She was given a referral to endocrinology to further discuss.\par She was also given a referral to nephrology regarding her kidney stones.\par Emotional support was given

## 2019-07-12 NOTE — PHYSICAL EXAM
[No Acute Distress] : no acute distress [Well Nourished] : well nourished [Well Developed] : well developed [Well-Appearing] : well-appearing [Normal Sclera/Conjunctiva] : normal sclera/conjunctiva [PERRL] : pupils equal round and reactive to light [EOMI] : extraocular movements intact [Normal Outer Ear/Nose] : the outer ears and nose were normal in appearance [Normal Oropharynx] : the oropharynx was normal [No JVD] : no jugular venous distention [No Lymphadenopathy] : no lymphadenopathy [Supple] : supple [Thyroid Normal, No Nodules] : the thyroid was normal and there were no nodules present [No Respiratory Distress] : no respiratory distress  [No Accessory Muscle Use] : no accessory muscle use [Clear to Auscultation] : lungs were clear to auscultation bilaterally [Normal Rate] : normal rate  [Regular Rhythm] : with a regular rhythm [Normal S1, S2] : normal S1 and S2 [No Murmur] : no murmur heard [No Carotid Bruits] : no carotid bruits [No Abdominal Bruit] : a ~M bruit was not heard ~T in the abdomen [Pedal Pulses Present] : the pedal pulses are present [No Edema] : there was no peripheral edema [No Palpable Aorta] : no palpable aorta [Soft] : abdomen soft [Non Tender] : non-tender [Non-distended] : non-distended [No Masses] : no abdominal mass palpated [No HSM] : no HSM [Normal Bowel Sounds] : normal bowel sounds [Normal Posterior Cervical Nodes] : no posterior cervical lymphadenopathy [Normal Anterior Cervical Nodes] : no anterior cervical lymphadenopathy [No CVA Tenderness] : no CVA  tenderness [No Spinal Tenderness] : no spinal tenderness [No Joint Swelling] : no joint swelling [Grossly Normal Strength/Tone] : grossly normal strength/tone [No Rash] : no rash [Normal Insight/Judgement] : insight and judgment were intact [Normal Affect] : the affect was normal [Normal Voice/Communication] : normal voice/communication [Speech Grossly Normal] : speech grossly normal [Normal Mood] : the mood was normal

## 2019-07-12 NOTE — DATA REVIEWED
[FreeTextEntry1] : Bone density  6/26/19....osteoporosis  T score -3.4 femoral neck, T score -3.1 spine

## 2019-07-12 NOTE — HISTORY OF PRESENT ILLNESS
[FreeTextEntry1] : Elevated blood pressure\par Osteoporosis\par Kidney stones [de-identified] : She has been monitoring her blood pressure at home and she has been getting elevated readings sometimes in the 130s to 140s sometimes 170 and the diastolics in the 100's. She has not  been feeling well at times getting headaches at times feeling a little dizzy. She doesn't relax for several minutes before taking her pressure. When she went on vacation she felt perfectly fine with no headaches or dizziness .  She is admittedly under a lot of stress from family members. She also is anxious over her own health issues. "I am not a well person". She brings up the fact that she's had kidney stones. She brings up the issues regarding her textured breast implants and she is still worried about their presence. She was told by the surgeon that surgery would be very complicated due to  the fact that she has had radiation and has thin skin.  She has no palpitations no flushing.  She's been seeing a nutritionist who has her on several herbal preparations. She does not take any NSAIDs, excess salt or decongestants\par She had  a bone density by her gynecologist which showed osteoporosis and she wants me to review the test.  She states her blood pressure was normal at the gynecologist. \par

## 2019-08-01 ENCOUNTER — OUTPATIENT (OUTPATIENT)
Dept: OUTPATIENT SERVICES | Facility: HOSPITAL | Age: 64
LOS: 1 days | Discharge: HOME | End: 2019-08-01

## 2019-08-01 DIAGNOSIS — C50.919 MALIGNANT NEOPLASM OF UNSPECIFIED SITE OF UNSPECIFIED FEMALE BREAST: ICD-10-CM

## 2019-08-03 ENCOUNTER — APPOINTMENT (OUTPATIENT)
Dept: ULTRASOUND IMAGING | Facility: IMAGING CENTER | Age: 64
End: 2019-08-03
Payer: COMMERCIAL

## 2019-08-03 ENCOUNTER — APPOINTMENT (OUTPATIENT)
Dept: RADIOLOGY | Facility: IMAGING CENTER | Age: 64
End: 2019-08-03
Payer: COMMERCIAL

## 2019-08-03 ENCOUNTER — OUTPATIENT (OUTPATIENT)
Dept: OUTPATIENT SERVICES | Facility: HOSPITAL | Age: 64
LOS: 1 days | End: 2019-08-03
Payer: COMMERCIAL

## 2019-08-03 DIAGNOSIS — Z00.8 ENCOUNTER FOR OTHER GENERAL EXAMINATION: ICD-10-CM

## 2019-08-03 PROCEDURE — 74018 RADEX ABDOMEN 1 VIEW: CPT | Mod: 26

## 2019-08-03 PROCEDURE — 74018 RADEX ABDOMEN 1 VIEW: CPT

## 2019-08-03 PROCEDURE — 76775 US EXAM ABDO BACK WALL LIM: CPT | Mod: 26

## 2019-08-03 PROCEDURE — 76775 US EXAM ABDO BACK WALL LIM: CPT

## 2019-08-05 ENCOUNTER — APPOINTMENT (OUTPATIENT)
Dept: HEMATOLOGY ONCOLOGY | Facility: CLINIC | Age: 64
End: 2019-08-05
Payer: COMMERCIAL

## 2019-08-05 VITALS
DIASTOLIC BLOOD PRESSURE: 91 MMHG | RESPIRATION RATE: 16 BRPM | OXYGEN SATURATION: 99 % | HEART RATE: 69 BPM | SYSTOLIC BLOOD PRESSURE: 159 MMHG | TEMPERATURE: 97.3 F

## 2019-08-05 PROCEDURE — 99213 OFFICE O/P EST LOW 20 MIN: CPT

## 2019-08-05 NOTE — HISTORY OF PRESENT ILLNESS
[de-identified] : This is a 60-year-old woman with history of breast carcinoma. The patient has undergone bilateral mastectomies for early stage breast carcinoma T1 B. and also received chemotherapy with CMF treatment. She decided not to take hormonal treatment even though her tumors were ER positive. She continues to do well and has had no evidence of recurrence of her disease. [de-identified] : Since the last vis it the pt was joshua osuna she has pleated implant and is at risk for developing rare type NHL. Pt was told reoperation would be difficult.

## 2019-08-05 NOTE — ASSESSMENT
[FreeTextEntry1] : Pt to continue surveillance for recurrence of her breast ca. pt had MRI of breast for chest and impplant pain and was neg with no rupture. Pt to consider repeat reconstruction but was told due to thin skin that healing would be difficult Pt to hold surgery and be monitored. [Curative] : Goals of care discussed with patient: Curative [Palliative Care Plan] : not applicable at this time

## 2019-08-05 NOTE — PHYSICAL EXAM
[Restricted in physically strenuous activity but ambulatory and able to carry out work of a light or sedentary nature] : Status 1- Restricted in physically strenuous activity but ambulatory and able to carry out work of a light or sedentary nature, e.g., light house work, office work [Normal] : affect appropriate [de-identified] : Bilateral mastectomies and reconstruction.

## 2019-09-05 ENCOUNTER — OUTPATIENT (OUTPATIENT)
Dept: OUTPATIENT SERVICES | Facility: HOSPITAL | Age: 64
LOS: 1 days | Discharge: HOME | End: 2019-09-05

## 2019-09-05 DIAGNOSIS — C50.919 MALIGNANT NEOPLASM OF UNSPECIFIED SITE OF UNSPECIFIED FEMALE BREAST: ICD-10-CM

## 2019-09-09 ENCOUNTER — RESULT REVIEW (OUTPATIENT)
Age: 64
End: 2019-09-09

## 2019-09-09 ENCOUNTER — APPOINTMENT (OUTPATIENT)
Dept: HEMATOLOGY ONCOLOGY | Facility: CLINIC | Age: 64
End: 2019-09-09
Payer: COMMERCIAL

## 2019-09-09 VITALS
HEART RATE: 71 BPM | WEIGHT: 95 LBS | OXYGEN SATURATION: 100 % | SYSTOLIC BLOOD PRESSURE: 160 MMHG | TEMPERATURE: 97.8 F | RESPIRATION RATE: 18 BRPM | BODY MASS INDEX: 18.55 KG/M2 | DIASTOLIC BLOOD PRESSURE: 95 MMHG

## 2019-09-09 DIAGNOSIS — Z80.8 FAMILY HISTORY OF MALIGNANT NEOPLASM OF OTHER ORGANS OR SYSTEMS: ICD-10-CM

## 2019-09-09 DIAGNOSIS — Z80.7 FAMILY HISTORY OF OTHER MALIGNANT NEOPLASMS OF LYMPHOID, HEMATOPOIETIC AND RELATED TISSUES: ICD-10-CM

## 2019-09-09 DIAGNOSIS — Z80.6 FAMILY HISTORY OF LEUKEMIA: ICD-10-CM

## 2019-09-09 DIAGNOSIS — Z80.1 FAMILY HISTORY OF MALIGNANT NEOPLASM OF TRACHEA, BRONCHUS AND LUNG: ICD-10-CM

## 2019-09-09 LAB
BASOPHILS # BLD AUTO: 0.1 K/UL — SIGNIFICANT CHANGE UP (ref 0–0.2)
BASOPHILS NFR BLD AUTO: 1.1 % — SIGNIFICANT CHANGE UP (ref 0–2)
EOSINOPHIL # BLD AUTO: 0.1 K/UL — SIGNIFICANT CHANGE UP (ref 0–0.5)
EOSINOPHIL NFR BLD AUTO: 1.4 % — SIGNIFICANT CHANGE UP (ref 0–6)
HCT VFR BLD CALC: 41.5 % — SIGNIFICANT CHANGE UP (ref 34.5–45)
HGB BLD-MCNC: 13.6 G/DL — SIGNIFICANT CHANGE UP (ref 11.5–15.5)
LYMPHOCYTES # BLD AUTO: 1.5 K/UL — SIGNIFICANT CHANGE UP (ref 1–3.3)
LYMPHOCYTES # BLD AUTO: 28.8 % — SIGNIFICANT CHANGE UP (ref 13–44)
MCHC RBC-ENTMCNC: 32.7 G/DL — SIGNIFICANT CHANGE UP (ref 32–36)
MCHC RBC-ENTMCNC: 33.5 PG — SIGNIFICANT CHANGE UP (ref 27–34)
MCV RBC AUTO: 103 FL — HIGH (ref 80–100)
MONOCYTES # BLD AUTO: 0.4 K/UL — SIGNIFICANT CHANGE UP (ref 0–0.9)
MONOCYTES NFR BLD AUTO: 8.6 % — SIGNIFICANT CHANGE UP (ref 2–14)
NEUTROPHILS # BLD AUTO: 3.1 K/UL — SIGNIFICANT CHANGE UP (ref 1.8–7.4)
NEUTROPHILS NFR BLD AUTO: 60.1 % — SIGNIFICANT CHANGE UP (ref 43–77)
PLATELET # BLD AUTO: 235 K/UL — SIGNIFICANT CHANGE UP (ref 150–400)
RBC # BLD: 4.05 M/UL — SIGNIFICANT CHANGE UP (ref 3.8–5.2)
RBC # FLD: 11.5 % — SIGNIFICANT CHANGE UP (ref 10.3–14.5)
WBC # BLD: 5.2 K/UL — SIGNIFICANT CHANGE UP (ref 3.8–10.5)
WBC # FLD AUTO: 5.2 K/UL — SIGNIFICANT CHANGE UP (ref 3.8–10.5)

## 2019-09-09 PROCEDURE — 99213 OFFICE O/P EST LOW 20 MIN: CPT

## 2019-09-10 PROBLEM — Z80.7 FAMILY HISTORY OF HODGKIN'S LYMPHOMA: Status: ACTIVE | Noted: 2019-09-09

## 2019-09-10 PROBLEM — Z80.1 FAMILY HISTORY OF LUNG CANCER: Status: ACTIVE | Noted: 2019-09-09

## 2019-09-10 PROBLEM — Z80.6 FAMILY HISTORY OF LEUKEMIA: Status: ACTIVE | Noted: 2019-09-09

## 2019-09-12 LAB
ALBUMIN SERPL ELPH-MCNC: 4.5 G/DL
ALP BLD-CCNC: 57 U/L
ALT SERPL-CCNC: 25 U/L
ANION GAP SERPL CALC-SCNC: 15 MMOL/L
AST SERPL-CCNC: 27 U/L
BILIRUB SERPL-MCNC: 0.3 MG/DL
BUN SERPL-MCNC: 20 MG/DL
CALCIUM SERPL-MCNC: 9.5 MG/DL
CHLORIDE SERPL-SCNC: 104 MMOL/L
CO2 SERPL-SCNC: 23 MMOL/L
CREAT SERPL-MCNC: 0.72 MG/DL
GLUCOSE SERPL-MCNC: 88 MG/DL
POTASSIUM SERPL-SCNC: 4.2 MMOL/L
PROT SERPL-MCNC: 6.5 G/DL
SODIUM SERPL-SCNC: 142 MMOL/L

## 2019-09-16 ENCOUNTER — APPOINTMENT (OUTPATIENT)
Dept: ULTRASOUND IMAGING | Facility: IMAGING CENTER | Age: 64
End: 2019-09-16
Payer: COMMERCIAL

## 2019-09-16 ENCOUNTER — OUTPATIENT (OUTPATIENT)
Dept: OUTPATIENT SERVICES | Facility: HOSPITAL | Age: 64
LOS: 1 days | End: 2019-09-16
Payer: COMMERCIAL

## 2019-09-16 DIAGNOSIS — Z00.8 ENCOUNTER FOR OTHER GENERAL EXAMINATION: ICD-10-CM

## 2019-09-16 PROCEDURE — 76830 TRANSVAGINAL US NON-OB: CPT

## 2019-09-16 PROCEDURE — 76856 US EXAM PELVIC COMPLETE: CPT | Mod: 26,59

## 2019-09-16 PROCEDURE — 76830 TRANSVAGINAL US NON-OB: CPT | Mod: 26

## 2019-09-16 PROCEDURE — 76856 US EXAM PELVIC COMPLETE: CPT

## 2019-10-06 NOTE — HISTORY OF PRESENT ILLNESS
[de-identified] : This is a 60-year-old woman with history of breast carcinoma. The patient has undergone bilateral mastectomies for early stage breast carcinoma T1 B. and also received chemotherapy with CMF treatment. She decided not to take hormonal treatment even though her tumors were ER positive. She continues to do well and has had no evidence of recurrence of her disease. [de-identified] : Patient is here for genetic testing. She is without any new medical complaints. Attending Attestation (For Attendings USE Only)...

## 2019-10-06 NOTE — ASSESSMENT
[Curative] : Goals of care discussed with patient: Curative [Palliative Care Plan] : not applicable at this time [FreeTextEntry1] : Pt to continue surveillance for recurrence of her breast ca. pt had MRI of breast for chest and implant pain and was neg with no rupture. Pt to consider repeat reconstruction but was told due to thin skin that healing would be difficult Pt to hold surgery and be monitored. Genetic test done, f/u. RTO in 1 year.

## 2019-10-06 NOTE — PHYSICAL EXAM
[Restricted in physically strenuous activity but ambulatory and able to carry out work of a light or sedentary nature] : Status 1- Restricted in physically strenuous activity but ambulatory and able to carry out work of a light or sedentary nature, e.g., light house work, office work [Normal] : affect appropriate [de-identified] : Bilateral mastectomies and reconstruction.

## 2019-11-15 LAB
MISCELLANEOUS TEST: NORMAL
PROC NAME: NORMAL

## 2020-01-04 ENCOUNTER — OUTPATIENT (OUTPATIENT)
Dept: OUTPATIENT SERVICES | Facility: HOSPITAL | Age: 65
LOS: 1 days | Discharge: HOME | End: 2020-01-04

## 2020-01-04 DIAGNOSIS — C50.919 MALIGNANT NEOPLASM OF UNSPECIFIED SITE OF UNSPECIFIED FEMALE BREAST: ICD-10-CM

## 2020-01-07 ENCOUNTER — APPOINTMENT (OUTPATIENT)
Dept: HEMATOLOGY ONCOLOGY | Facility: CLINIC | Age: 65
End: 2020-01-07

## 2020-02-19 ENCOUNTER — APPOINTMENT (OUTPATIENT)
Dept: INTERNAL MEDICINE | Facility: CLINIC | Age: 65
End: 2020-02-19
Payer: COMMERCIAL

## 2020-02-19 ENCOUNTER — NON-APPOINTMENT (OUTPATIENT)
Age: 65
End: 2020-02-19

## 2020-02-19 VITALS
OXYGEN SATURATION: 99 % | DIASTOLIC BLOOD PRESSURE: 96 MMHG | TEMPERATURE: 97.9 F | BODY MASS INDEX: 19.44 KG/M2 | HEIGHT: 60 IN | WEIGHT: 99 LBS | SYSTOLIC BLOOD PRESSURE: 162 MMHG | HEART RATE: 61 BPM

## 2020-02-19 VITALS — SYSTOLIC BLOOD PRESSURE: 150 MMHG | DIASTOLIC BLOOD PRESSURE: 96 MMHG

## 2020-02-19 DIAGNOSIS — R94.31 ABNORMAL ELECTROCARDIOGRAM [ECG] [EKG]: ICD-10-CM

## 2020-02-19 PROCEDURE — 93000 ELECTROCARDIOGRAM COMPLETE: CPT

## 2020-02-19 PROCEDURE — 99243 OFF/OP CNSLTJ NEW/EST LOW 30: CPT | Mod: 25

## 2020-02-19 RX ORDER — TOBRAMYCIN 3 MG/ML
SOLUTION/ DROPS OPHTHALMIC
Refills: 0 | Status: DISCONTINUED | COMMUNITY
End: 2020-02-19

## 2020-02-21 VITALS — SYSTOLIC BLOOD PRESSURE: 138 MMHG | DIASTOLIC BLOOD PRESSURE: 80 MMHG

## 2020-02-21 VITALS — SYSTOLIC BLOOD PRESSURE: 136 MMHG | DIASTOLIC BLOOD PRESSURE: 92 MMHG

## 2020-02-28 VITALS — DIASTOLIC BLOOD PRESSURE: 88 MMHG | SYSTOLIC BLOOD PRESSURE: 148 MMHG

## 2020-02-28 VITALS — DIASTOLIC BLOOD PRESSURE: 86 MMHG | SYSTOLIC BLOOD PRESSURE: 138 MMHG

## 2020-02-28 VITALS — DIASTOLIC BLOOD PRESSURE: 90 MMHG | SYSTOLIC BLOOD PRESSURE: 138 MMHG

## 2020-02-29 NOTE — ADDENDUM
[FreeTextEntry1] : February 28 2020: Patient has been begun on irbesartan 75 mg daily for elevated blood pressure. Her blood pressure today is 148//86.Her presurgical blood work done on February 24, 2020 is all normal and is attached.   She will continue to take irbesartan 75 mg at bedtime and is a good candidate for upcoming surgery and there are no medical contraindications to proceed.

## 2020-02-29 NOTE — CONSULT LETTER
[Dear  ___] : Dear  [unfilled], [Consult Letter:] : I had the pleasure of evaluating your patient, [unfilled]. [Please see my note below.] : Please see my note below. [Consult Closing:] : Thank you very much for allowing me to participate in the care of this patient.  If you have any questions, please do not hesitate to contact me. [FreeTextEntry1] : for preoperative medical clearance

## 2020-02-29 NOTE — HISTORY OF PRESENT ILLNESS
[No Pertinent Pulmonary History] : no history of asthma, COPD, sleep apnea, or smoking [No Pertinent Cardiac History] : no history of aortic stenosis, atrial fibrillation, coronary artery disease, recent myocardial infarction, or implantable device/pacemaker [No Adverse Anesthesia Reaction] : no adverse anesthesia reaction in self or family member [(Patient denies any chest pain, claudication, dyspnea on exertion, orthopnea, palpitations or syncope)] : Patient denies any chest pain, claudication, dyspnea on exertion, orthopnea, palpitations or syncope [Good (7-10 METs)] : Good (7-10 METs) [Chronic Anticoagulation] : no chronic anticoagulation [Diabetes] : no diabetes [Chronic Kidney Disease] : no chronic kidney disease [FreeTextEntry1] : Neck surgery [FreeTextEntry2] : March 6, 2020 [FreeTextEntry4] : She is planning surgery on her neck by Dr. Moreno. She has not had problems with prior surgery or anesthesia .  She has no unusual bruising or bleeding. She is physically active with Pilates and going on the treadmill 20 minutes daily without cardiopulmonary complaint [FreeTextEntry3] : Dr. Moreno

## 2020-02-29 NOTE — ASSESSMENT
[Patient NOT optimized for Surgery at this time] : Patient not optimized for surgery at this time [Other: _____] : [unfilled] [Modify medications prior to procedure] : Modify medications prior to procedure [FreeTextEntry4] : Her blood pressure is elevated today and she will need to have this rechecked before we can clear her for surgery.  Her EKG findings are chronic and stable and she is physically active without chest pain or dyspnea. She has had a normal echocardiogram in the past. She will stop her herbal supplements a week prior to surgery. [FreeTextEntry7] : Stop all herbal supplements 1 week prior to surgery

## 2020-04-15 ENCOUNTER — OUTPATIENT (OUTPATIENT)
Dept: OUTPATIENT SERVICES | Facility: HOSPITAL | Age: 65
LOS: 1 days | Discharge: HOME | End: 2020-04-15

## 2020-04-15 DIAGNOSIS — C50.919 MALIGNANT NEOPLASM OF UNSPECIFIED SITE OF UNSPECIFIED FEMALE BREAST: ICD-10-CM

## 2020-04-22 ENCOUNTER — APPOINTMENT (OUTPATIENT)
Dept: HEMATOLOGY ONCOLOGY | Facility: CLINIC | Age: 65
End: 2020-04-22

## 2020-04-23 ENCOUNTER — APPOINTMENT (OUTPATIENT)
Dept: INTERNAL MEDICINE | Facility: CLINIC | Age: 65
End: 2020-04-23
Payer: COMMERCIAL

## 2020-04-23 DIAGNOSIS — M25.559 PAIN IN UNSPECIFIED HIP: ICD-10-CM

## 2020-04-23 DIAGNOSIS — Z01.818 ENCOUNTER FOR OTHER PREPROCEDURAL EXAMINATION: ICD-10-CM

## 2020-04-23 PROCEDURE — 99213 OFFICE O/P EST LOW 20 MIN: CPT | Mod: 95

## 2020-04-23 NOTE — HISTORY OF PRESENT ILLNESS
[Home] : at home, [unfilled] , at the time of the visit. [Medical Office: ([facility])___] : at the medical office located in  [FreeTextEntry1] : Hair loss\par Hypertension\par Hip pain [de-identified] : She had her neck surgery March 6, 2020. Post operatively she had a short course of prednisone.   She did not take antibiotics. Soon thereafter she noted that her hair was thinner. She was not sure if this was from the irbesartan or prednisone.   She spoke to her dermatologist Dr. Guardado who felt it was from prednisone. He advised her to begin [id number] mg and zinc 10 mg and observe and follow up with him in 4-6 weeks. She thinks the hair loss is lessening. She had similar hair loss many years ago when she was stressed and saw Dr. Massey and got injections in her scalp. She is not using any new cosmetics on her scalp.\par She states her blood pressures are running good 117/78, 126/80, 143/99, and 122/81, 129/87.She is taking irbesartan at night and sometimes in the morning she can feel transiently a little like her head is full. Other than that she has no side effects She is watching salt in her diet. She takes occasional Advil for some hip pain and is planning to see orthopedics soon. She cannot exercise due to hip pain

## 2020-04-23 NOTE — PHYSICAL EXAM
[No Acute Distress] : no acute distress [Well Nourished] : well nourished [Well-Appearing] : well-appearing [Well Developed] : well developed [No Respiratory Distress] : no respiratory distress  [Normal Voice/Communication] : normal voice/communication [No Accessory Muscle Use] : no accessory muscle use [Memory Grossly Normal] : memory grossly normal [Speech Grossly Normal] : speech grossly normal [Alert and Oriented x3] : oriented to person, place, and time [Normal Affect] : the affect was normal [Normal Insight/Judgement] : insight and judgment were intact [Normal Mood] : the mood was normal [de-identified] : hair looks normal on scalp.

## 2020-04-23 NOTE — ASSESSMENT
[FreeTextEntry1] : I suspect her hair loss is most likely due to stress from surgery and use of prednisone and less likely due to the irbesartan. This may already be improving and she will be observed.She will followup with dermatology. Will consider blood work and perhaps change in antihypertensive if her hair loss continues Her blood pressure appears to be adequately controlled. I recommended she try to limit Advil and use Tylenol for pain. She will follow up with orthopedics regarding her hip pain.\par She will make a follow up here for her annual wellness visit over the summer. She knows to call for any issues

## 2020-05-05 ENCOUNTER — APPOINTMENT (OUTPATIENT)
Dept: ORTHOPEDIC SURGERY | Facility: CLINIC | Age: 65
End: 2020-05-05
Payer: COMMERCIAL

## 2020-05-05 DIAGNOSIS — M25.552 PAIN IN LEFT HIP: ICD-10-CM

## 2020-05-05 PROCEDURE — 20610 DRAIN/INJ JOINT/BURSA W/O US: CPT | Mod: RT

## 2020-05-05 PROCEDURE — 73522 X-RAY EXAM HIPS BI 3-4 VIEWS: CPT

## 2020-05-05 PROCEDURE — 99214 OFFICE O/P EST MOD 30 MIN: CPT | Mod: 25

## 2020-05-06 ENCOUNTER — APPOINTMENT (OUTPATIENT)
Dept: ORTHOPEDIC SURGERY | Facility: CLINIC | Age: 65
End: 2020-05-06

## 2020-05-08 ENCOUNTER — APPOINTMENT (OUTPATIENT)
Dept: ORTHOPEDIC SURGERY | Facility: CLINIC | Age: 65
End: 2020-05-08
Payer: COMMERCIAL

## 2020-05-08 DIAGNOSIS — M70.61 TROCHANTERIC BURSITIS, RIGHT HIP: ICD-10-CM

## 2020-05-08 PROCEDURE — 99441: CPT

## 2020-06-12 ENCOUNTER — OUTPATIENT (OUTPATIENT)
Dept: OUTPATIENT SERVICES | Facility: HOSPITAL | Age: 65
LOS: 1 days | Discharge: HOME | End: 2020-06-12

## 2020-06-12 ENCOUNTER — APPOINTMENT (OUTPATIENT)
Dept: HEMATOLOGY ONCOLOGY | Facility: CLINIC | Age: 65
End: 2020-06-12

## 2020-06-12 DIAGNOSIS — C50.919 MALIGNANT NEOPLASM OF UNSPECIFIED SITE OF UNSPECIFIED FEMALE BREAST: ICD-10-CM

## 2020-06-13 ENCOUNTER — RX RENEWAL (OUTPATIENT)
Age: 65
End: 2020-06-13

## 2020-07-16 ENCOUNTER — APPOINTMENT (OUTPATIENT)
Dept: OTOLARYNGOLOGY | Facility: CLINIC | Age: 65
End: 2020-07-16
Payer: COMMERCIAL

## 2020-07-16 ENCOUNTER — TRANSCRIPTION ENCOUNTER (OUTPATIENT)
Age: 65
End: 2020-07-16

## 2020-07-16 VITALS
WEIGHT: 95 LBS | DIASTOLIC BLOOD PRESSURE: 83 MMHG | HEIGHT: 60 IN | SYSTOLIC BLOOD PRESSURE: 136 MMHG | TEMPERATURE: 98 F | BODY MASS INDEX: 18.65 KG/M2 | HEART RATE: 63 BPM

## 2020-07-16 PROCEDURE — 69210 REMOVE IMPACTED EAR WAX UNI: CPT

## 2020-07-16 PROCEDURE — 99204 OFFICE O/P NEW MOD 45 MIN: CPT | Mod: 25

## 2020-07-16 NOTE — ASSESSMENT
[FreeTextEntry1] : Central Vertigo:\par -continue vestibular rehab -Dr Irene\par \par Wax:\par -Cerumen is removed from the right ear canal with a curette and suction.\par -Rx:Debrox be placed in both ears on a routine basis to keep them free of wax.\par -Routine debridement suggested to keep the ears free of wax.\par \par f/u prn

## 2020-07-16 NOTE — PHYSICAL EXAM
[Midline] : trachea located in midline position [Normal] : horizontal positional vertigo maneuver was normal [Fukuda Step Test] : Fukuda Step Test was Positive [Hearing Loss Left Only] : normal [Hearing Loss Right Only] : normal [Nystagmus] : ~T no ~M nystagmus was seen [FreeTextEntry8] : wax [de-identified] : weak step test [de-identified] : wide step and sway to the left

## 2020-07-16 NOTE — DATA REVIEWED
[de-identified] : AZEEM [de-identified] : wnl [de-identified] : ENG in past-central vestib weakness

## 2020-07-16 NOTE — HISTORY OF PRESENT ILLNESS
[Vertigo] : vertigo [Dizziness] : dizziness [No] : patient does not have a  history of radiation therapy [de-identified] : 63 yo femalePatient complains of vertigo x several weeks. States she got out of bed and felt off balance, feels like she is on a boat. Went to physical therapy was told that she has BPPV. States she continues to have vertigo. Also gets a fullness in the right ear, not associated with the vertigo. States she had a neck lift recently and since then right ear has been feeling full. Had vertigo in the past was told its cervical vertigo.\par Pt has no ear pain, ear drainage, hearing loss, tinnitus, nasal congestion, nasal discharge, epistaxis, sinus infections, facial pain, facial pressure, throat pain, dysphagia or fevers.\par \par  [Anxiety] : no anxiety [Headache] : no headache [Hearing Loss] : no hearing loss [Recurrent Otitis Media] : no recurrent otitis media [Otitis Media with Effusion] : no otitis media with effusion [Presbycusis] : no presbycusis [Congenital Ear Malformation] : no congenital ear malformation [Meniere Disease] : no Meniere disease [Otosclerosis] : no otosclerosis [Perilymphatic Fistula] : no perilymphatic fistula [Hypertension] : no hypertension [Loud Noise Exposure] : no history of loud noise exposure [Smoking] : no smoking [Early Onset Hearing Loss] : no early onset hearing loss [Stroke] : no stroke [Facial Pain] : no facial pain [Facial Pressure] : no facial pressure [Nasal Congestion] : no nasal congestion [Diplopia] : no diplopia [Allergic Rhinitis] : no allergic rhinitis [Ear Fullness] : no ear fullness [Maxillary Tooth Infection] : no maxillary tooth infection [Septal Deviation] : no septal deviation [Environmental Allergies] : no environmental allergies [Seasonal Allergies] : no seasonal allergies [Environmental Allergens] : no environmental allergens [Adenoidectomy] : no adenoidectomy [Nasal FB Removal] : no nasal foreign body removal [Asthma] : no asthma [Allergies] : no allergies [Neck Mass] : no neck mass [Neck Pain] : no neck pain [Cough] : no cough [Cold Intolerance] : no cold intolerance [Chills] : no chills [Heat Intolerance] : no heat intolerance [Fatigue] : no fatigue [Hyperthyroidism] : no hyperthyroidism [Sialadenitis] : no sialadenitis [Hodgkin Disease] : no hodgkin disease [Tobacco Use] : no tobacco use [Non-Hodgkin Lymphoma] : no non-hodgkin lymphoma [Alcohol Use] : no alcohol use [Graves Disease] : no graves disease [Thyroid Cancer] : no thyroid cancer

## 2020-07-20 ENCOUNTER — APPOINTMENT (OUTPATIENT)
Dept: INTERNAL MEDICINE | Facility: CLINIC | Age: 65
End: 2020-07-20
Payer: COMMERCIAL

## 2020-07-20 VITALS — SYSTOLIC BLOOD PRESSURE: 134 MMHG | DIASTOLIC BLOOD PRESSURE: 86 MMHG

## 2020-07-20 VITALS
OXYGEN SATURATION: 100 % | DIASTOLIC BLOOD PRESSURE: 80 MMHG | HEART RATE: 70 BPM | SYSTOLIC BLOOD PRESSURE: 148 MMHG | TEMPERATURE: 97.6 F

## 2020-07-20 DIAGNOSIS — H61.23 IMPACTED CERUMEN, BILATERAL: ICD-10-CM

## 2020-07-20 PROCEDURE — 99214 OFFICE O/P EST MOD 30 MIN: CPT | Mod: 25

## 2020-07-20 PROCEDURE — 36415 COLL VENOUS BLD VENIPUNCTURE: CPT

## 2020-07-20 RX ORDER — METHYLPREDNISOLONE 4 MG/1
4 TABLET ORAL
Qty: 21 | Refills: 0 | Status: COMPLETED | COMMUNITY
Start: 2020-02-27

## 2020-07-20 RX ORDER — NYSTATIN 100000 [USP'U]/G
100000 CREAM TOPICAL
Qty: 30 | Refills: 0 | Status: COMPLETED | COMMUNITY
Start: 2020-03-18

## 2020-07-20 RX ORDER — NYSTATIN 100000 [USP'U]/ML
100000 SUSPENSION ORAL
Qty: 473 | Refills: 0 | Status: COMPLETED | COMMUNITY
Start: 2020-03-18

## 2020-07-20 RX ORDER — BETAMETHASONE DIPROPIONATE 0.5 MG/ML
0.05 LOTION, AUGMENTED TOPICAL
Qty: 30 | Refills: 0 | Status: COMPLETED | COMMUNITY
Start: 2020-06-09

## 2020-07-20 RX ORDER — ONDANSETRON 4 MG/1
4 TABLET, ORALLY DISINTEGRATING ORAL
Qty: 10 | Refills: 0 | Status: COMPLETED | COMMUNITY
Start: 2020-02-27

## 2020-07-20 RX ORDER — OXYCODONE AND ACETAMINOPHEN 5; 325 MG/1; MG/1
5-325 TABLET ORAL
Qty: 20 | Refills: 0 | Status: COMPLETED | COMMUNITY
Start: 2020-02-27

## 2020-07-21 PROBLEM — H61.23 EXCESSIVE EAR WAX, BILATERAL: Status: RESOLVED | Noted: 2020-07-16 | Resolved: 2020-07-21

## 2020-07-21 NOTE — ASSESSMENT
[FreeTextEntry1] : I told patient I did not feel the irbesartan is causing any of her dizziness and her blood pressure is still a little bit borderline elevated. She really wants to give a trial of cutting it in half to see if this will affect the morning dizziness but she will be holding this off for a week as she has multiple visits for her daughter's health. If she decides a trial of cutting irbesartan in half she will report her progress to me in 2-3 weeks.                                     She will followup with physical therapy for balance therapy.\par We discussed her recent blood work from dermatologist.  We redrew a testosterone and sex binding globulin level. She will followup with dermatology regarding her hair loss

## 2020-07-21 NOTE — HISTORY OF PRESENT ILLNESS
[FreeTextEntry1] : Vertigo\par Hypertension\par Hair loss [de-identified] : She states since beginning irbesartan she has been  feeling woozy for an hour or so when she gets up in the morning. She takes irbesartan at night. The past several weeks she has been bothered by positional vertigo. She has had this in the past as well.  Her hearing is fine and she has no tinnitus.  She has seen physical therapy ENT and neuro and is planning balance therapy. She is doing [place] exercises. There was no trigger of this but she has been under increasing stress as her daughter is having health issues. She monitors her blood pressure at home and it has been done with running in the high 130s over 80s. She tries to be careful with salt in her diet \par She feels she is losing some hair. She saw her her regular dermatologist Dr. Guardado who felt it was just hair breaking but she subsequently went to a specialist and had multiple blood  tests which she referred to me. This showed an elevated total testosterone but normal free testosterone. She questions the significance of this.

## 2020-07-21 NOTE — PHYSICAL EXAM
[No Acute Distress] : no acute distress [Well Nourished] : well nourished [Well Developed] : well developed [Well-Appearing] : well-appearing [Normal Voice/Communication] : normal voice/communication [Normal Sclera/Conjunctiva] : normal sclera/conjunctiva [PERRL] : pupils equal round and reactive to light [EOMI] : extraocular movements intact [Normal Outer Ear/Nose] : the outer ears and nose were normal in appearance [Normal Oropharynx] : the oropharynx was normal [No JVD] : no jugular venous distention [No Lymphadenopathy] : no lymphadenopathy [No Respiratory Distress] : no respiratory distress  [Supple] : supple [Thyroid Normal, No Nodules] : the thyroid was normal and there were no nodules present [No Accessory Muscle Use] : no accessory muscle use [Clear to Auscultation] : lungs were clear to auscultation bilaterally [Normal Rate] : normal rate  [Regular Rhythm] : with a regular rhythm [No Carotid Bruits] : no carotid bruits [Normal S1, S2] : normal S1 and S2 [No Murmur] : no murmur heard [No Edema] : there was no peripheral edema [Soft] : abdomen soft [Non-distended] : non-distended [Non Tender] : non-tender [No Masses] : no abdominal mass palpated [No HSM] : no HSM [Normal Bowel Sounds] : normal bowel sounds [Normal Supraclavicular Nodes] : no supraclavicular lymphadenopathy [Normal Posterior Cervical Nodes] : no posterior cervical lymphadenopathy [Normal Anterior Cervical Nodes] : no anterior cervical lymphadenopathy [No Rash] : no rash [Coordination Grossly Intact] : coordination grossly intact [Normal Gait] : normal gait [No Focal Deficits] : no focal deficits [Deep Tendon Reflexes (DTR)] : deep tendon reflexes were 2+ and symmetric [Speech Grossly Normal] : speech grossly normal [Memory Grossly Normal] : memory grossly normal [Normal Affect] : the affect was normal [Alert and Oriented x3] : oriented to person, place, and time [Normal Insight/Judgement] : insight and judgment were intact [Normal Mood] : the mood was normal [Normal TMs] : both tympanic membranes were normal [Normal Percussion] : the chest was normal to percussion [de-identified] : no visible alopecia [de-identified] : No nystagmus

## 2020-08-05 ENCOUNTER — APPOINTMENT (OUTPATIENT)
Dept: OTOLARYNGOLOGY | Facility: CLINIC | Age: 65
End: 2020-08-05

## 2020-08-05 DIAGNOSIS — L65.9 NONSCARRING HAIR LOSS, UNSPECIFIED: ICD-10-CM

## 2020-08-05 LAB
SHBG SERPL-SCNC: 85 NMOL/L
TESTOST BND SERPL-MCNC: 11.6 PG/ML
TESTOST SERPL-MCNC: 407.4 NG/DL

## 2020-08-06 ENCOUNTER — APPOINTMENT (OUTPATIENT)
Dept: INTERNAL MEDICINE | Facility: CLINIC | Age: 65
End: 2020-08-06
Payer: COMMERCIAL

## 2020-08-06 PROCEDURE — 36415 COLL VENOUS BLD VENIPUNCTURE: CPT

## 2020-08-07 LAB — TESTOST SERPL-MCNC: 33.8 NG/DL

## 2020-08-10 LAB — DHEA-SULFATE, SERUM: 44 UG/DL

## 2020-08-11 ENCOUNTER — APPOINTMENT (OUTPATIENT)
Dept: CT IMAGING | Facility: CLINIC | Age: 65
End: 2020-08-11

## 2020-08-14 ENCOUNTER — APPOINTMENT (OUTPATIENT)
Dept: ULTRASOUND IMAGING | Facility: CLINIC | Age: 65
End: 2020-08-14

## 2020-10-09 ENCOUNTER — RX RENEWAL (OUTPATIENT)
Age: 65
End: 2020-10-09

## 2020-10-14 ENCOUNTER — APPOINTMENT (OUTPATIENT)
Dept: INTERNAL MEDICINE | Facility: CLINIC | Age: 65
End: 2020-10-14
Payer: COMMERCIAL

## 2020-10-14 VITALS
WEIGHT: 96 LBS | TEMPERATURE: 35.5 F | HEART RATE: 68 BPM | SYSTOLIC BLOOD PRESSURE: 152 MMHG | DIASTOLIC BLOOD PRESSURE: 90 MMHG | OXYGEN SATURATION: 100 % | HEIGHT: 60 IN | BODY MASS INDEX: 18.85 KG/M2

## 2020-10-14 VITALS — DIASTOLIC BLOOD PRESSURE: 86 MMHG | SYSTOLIC BLOOD PRESSURE: 130 MMHG

## 2020-10-14 DIAGNOSIS — Z87.09 PERSONAL HISTORY OF OTHER DISEASES OF THE RESPIRATORY SYSTEM: ICD-10-CM

## 2020-10-14 DIAGNOSIS — R42 DIZZINESS AND GIDDINESS: ICD-10-CM

## 2020-10-14 DIAGNOSIS — R79.89 OTHER SPECIFIED ABNORMAL FINDINGS OF BLOOD CHEMISTRY: ICD-10-CM

## 2020-10-14 DIAGNOSIS — M81.0 AGE-RELATED OSTEOPOROSIS W/OUT CURRENT PATHOLOGICAL FRACTURE: ICD-10-CM

## 2020-10-14 DIAGNOSIS — Z23 ENCOUNTER FOR IMMUNIZATION: ICD-10-CM

## 2020-10-14 DIAGNOSIS — E78.5 HYPERLIPIDEMIA, UNSPECIFIED: ICD-10-CM

## 2020-10-14 DIAGNOSIS — Z85.3 PERSONAL HISTORY OF MALIGNANT NEOPLASM OF BREAST: ICD-10-CM

## 2020-10-14 DIAGNOSIS — N20.0 CALCULUS OF KIDNEY: ICD-10-CM

## 2020-10-14 DIAGNOSIS — Z00.00 ENCOUNTER FOR GENERAL ADULT MEDICAL EXAMINATION W/OUT ABNORMAL FINDINGS: ICD-10-CM

## 2020-10-14 DIAGNOSIS — Z13.31 ENCOUNTER FOR SCREENING FOR DEPRESSION: ICD-10-CM

## 2020-10-14 PROCEDURE — 36415 COLL VENOUS BLD VENIPUNCTURE: CPT

## 2020-10-14 PROCEDURE — 99397 PER PM REEVAL EST PAT 65+ YR: CPT | Mod: 25

## 2020-10-14 PROCEDURE — G0444 DEPRESSION SCREEN ANNUAL: CPT

## 2020-10-14 PROCEDURE — 90662 IIV NO PRSV INCREASED AG IM: CPT

## 2020-10-14 PROCEDURE — G0008: CPT

## 2020-10-14 RX ORDER — CLOTRIMAZOLE 10 MG/1
10 LOZENGE ORAL DAILY
Qty: 20 | Refills: 1 | Status: ACTIVE | COMMUNITY
Start: 2020-10-14 | End: 1900-01-01

## 2020-10-14 NOTE — HISTORY OF PRESENT ILLNESS
[FreeTextEntry1] : CPE [de-identified] : She has been monitoring her blood pressure at home it is generally running 120 to 130s over 80s. She tries to be careful with salt in her diet. She been having ongoing issues with recurrent positional vertigo and is going for vestibular therapy. She feels this  when she bends over lyes  down or turns to the right. She has seen ENT twice and her hearing is fine. She has been under increased stress as her daughter has required GI surgery. She is complaining of burning on her tongue and also a sore throat. She states she feels like when she had thrush and was seen in urgent care. She does not suck on candies. She has not been on any recent antibiotics. She has no fever or chills. She sees the dermatologist Dr Guardado and oncologist Dr. Daley yearly. She denies any hematuria or pain reminiscent of prior kidney stone pain

## 2020-10-14 NOTE — HEALTH RISK ASSESSMENT
[No falls in past year] : Patient reported no falls in the past year [0] : 2) Feeling down, depressed, or hopeless: Not at all (0) [Patient declined mammogram] : Patient declined mammogram [Patient reported PAP Smear was normal] : Patient reported PAP Smear was normal [Patient declined bone density test] : Patient declined bone density test [None] : None [With Family] : lives with family [Employed] : employed [College] : College [] :  [# Of Children ___] : has [unfilled] children [Sexually Active] : sexually active [Feels Safe at Home] : Feels safe at home [Fully functional (bathing, dressing, toileting, transferring, walking, feeding)] : Fully functional (bathing, dressing, toileting, transferring, walking, feeding) [Fully functional (using the telephone, shopping, preparing meals, housekeeping, doing laundry, using] : Fully functional and needs no help or supervision to perform IADLs (using the telephone, shopping, preparing meals, housekeeping, doing laundry, using transportation, managing medications and managing finances) [Reports normal functional visual acuity (ie: able to read med bottle)] : Reports normal functional visual acuity [Smoke Detector] : smoke detector [Carbon Monoxide Detector] : carbon monoxide detector [Seat Belt] :  uses seat belt [Sunscreen] : uses sunscreen [Yes] : Yes [Hepatitis C test offered] : Hepatitis C test offered [] : No [de-identified] : wine 5 days a week [de-identified] : daily treadmill, bar class on line [de-identified] : eats a healthy diet With adequate calcium [[id number]] : 0 [Change in mental status noted] : No change in mental status noted [Language] : denies difficulty with language [Behavior] : denies difficulty with behavior [Learning/Retaining New Information] : denies difficulty learning/retaining new information [Handling Complex Tasks] : denies difficulty handling complex tasks [Reasoning] : denies difficulty with reasoning [Spatial Ability and Orientation] : denies difficulty with spatial ability and orientation [High Risk Behavior] : no high risk behavior [Reports changes in hearing] : Reports no changes in hearing [Reports changes in vision] : Reports no changes in vision [Reports changes in dental health] : Reports no changes in dental health [Guns at Home] : no guns at home [MammogramComments] : bilateral mastectomy [PapSmearDate] : 2018 [BoneDensityDate] : 2019 [BoneDensityComments] : osteoporosis [ColonoscopyDate] : 8/2012 [ColonoscopyComments] : no polyps [HepatitisCDate] : 11/2017 [HepatitisCComments] : nonreactive

## 2020-10-14 NOTE — PHYSICAL EXAM
[No Acute Distress] : no acute distress [Well Nourished] : well nourished [Well Developed] : well developed [Well-Appearing] : well-appearing [Normal Voice/Communication] : normal voice/communication [Normal Sclera/Conjunctiva] : normal sclera/conjunctiva [PERRL] : pupils equal round and reactive to light [EOMI] : extraocular movements intact [Normal Outer Ear/Nose] : the outer ears and nose were normal in appearance [Normal Oropharynx] : the oropharynx was normal [Normal TMs] : both tympanic membranes were normal [No JVD] : no jugular venous distention [Supple] : supple [No Lymphadenopathy] : no lymphadenopathy [Thyroid Normal, No Nodules] : the thyroid was normal and there were no nodules present [No Respiratory Distress] : no respiratory distress  [Clear to Auscultation] : lungs were clear to auscultation bilaterally [No Accessory Muscle Use] : no accessory muscle use [Normal Percussion] : the chest was normal to percussion [Normal Rate] : normal rate  [Regular Rhythm] : with a regular rhythm [Normal S1, S2] : normal S1 and S2 [No Murmur] : no murmur heard [No Carotid Bruits] : no carotid bruits [No Abdominal Bruit] : a ~M bruit was not heard ~T in the abdomen [No Varicosities] : no varicosities [Pedal Pulses Present] : the pedal pulses are present [No Edema] : there was no peripheral edema [No Extremity Clubbing/Cyanosis] : no extremity clubbing/cyanosis [No Palpable Aorta] : no palpable aorta [Soft] : abdomen soft [Non Tender] : non-tender [Non-distended] : non-distended [No Masses] : no abdominal mass palpated [No HSM] : no HSM [Normal Bowel Sounds] : normal bowel sounds [Normal Supraclavicular Nodes] : no supraclavicular lymphadenopathy [Normal Axillary Nodes] : no axillary lymphadenopathy [Normal Posterior Cervical Nodes] : no posterior cervical lymphadenopathy [Normal Anterior Cervical Nodes] : no anterior cervical lymphadenopathy [Normal Inguinal Nodes] : no inguinal lymphadenopathy [No CVA Tenderness] : no CVA  tenderness [No Spinal Tenderness] : no spinal tenderness [No Joint Swelling] : no joint swelling [Grossly Normal Strength/Tone] : grossly normal strength/tone [No Rash] : no rash [No Skin Lesions] : no skin lesions [Normal Gait] : normal gait [Coordination Grossly Intact] : coordination grossly intact [No Focal Deficits] : no focal deficits [Deep Tendon Reflexes (DTR)] : deep tendon reflexes were 2+ and symmetric [Speech Grossly Normal] : speech grossly normal [Memory Grossly Normal] : memory grossly normal [Normal Affect] : the affect was normal [Alert and Oriented x3] : oriented to person, place, and time [Normal Mood] : the mood was normal [Normal Insight/Judgement] : insight and judgment were intact [Kyphosis] : no kyphosis [Scoliosis] : no scoliosis [de-identified] : thin. Her blood pressure machine is reading about 10 points higher systolic and diastolic and arms she [de-identified] : Status post bilateral mastectomy with implant reconstruction

## 2020-10-14 NOTE — ASSESSMENT
[FreeTextEntry1] : Patient's blood pressure appears adequately controlled. She will continue on irbesartan. Her home machine here today is reading a bit higher than my sphygmomanometer. She was encouraged to continue with adequate hydration and salt restriction for history of kidney stones. She defers going for a followup renal sonogram or seeing nephrology or urology for reevaluation of her kidney stones. She defers any treatment for her known osteoporosis and is aware of the risk of fractures. She will continue with regular oncology and dermatology followup. She is due for colonoscopy in 2012. She was given a high-dose influenza vaccine today. She is due for a tetanus booster , Shingrix  vaccine and Prevnar 13. She defers any additional vaccine today and will make followup appointments  for these vaccines.\par Regarding her complaint of sore throat her oropharynx looks normal. A throat culture was done. She may have a degree of burning tongue syndrome and we discussed this. As she feels similar to when she had oral thrush in the past we'll give her a trial of Mycelex troches  although I do not see evidence of this.\par She will continue her with vestibular therapy for history of benign positional vertigo\par Screening blood work was sent\par She'll be seen again in 6 month\par

## 2020-10-15 LAB
25(OH)D3 SERPL-MCNC: 56.4 NG/ML
ALBUMIN SERPL ELPH-MCNC: 4.7 G/DL
ALP BLD-CCNC: 65 U/L
ALT SERPL-CCNC: 21 U/L
ANION GAP SERPL CALC-SCNC: 11 MMOL/L
APPEARANCE: CLEAR
AST SERPL-CCNC: 23 U/L
BACTERIA: NEGATIVE
BASOPHILS # BLD AUTO: 0.05 K/UL
BASOPHILS NFR BLD AUTO: 1.1 %
BILIRUB SERPL-MCNC: 0.4 MG/DL
BILIRUBIN URINE: NEGATIVE
BLOOD URINE: NEGATIVE
BUN SERPL-MCNC: 15 MG/DL
CALCIUM SERPL-MCNC: 9.4 MG/DL
CHLORIDE SERPL-SCNC: 104 MMOL/L
CHOLEST SERPL-MCNC: 255 MG/DL
CHOLEST/HDLC SERPL: 2.2 RATIO
CO2 SERPL-SCNC: 27 MMOL/L
COLOR: YELLOW
CREAT SERPL-MCNC: 0.69 MG/DL
EOSINOPHIL # BLD AUTO: 0.08 K/UL
EOSINOPHIL NFR BLD AUTO: 1.8 %
ESTIMATED AVERAGE GLUCOSE: 114 MG/DL
GLUCOSE QUALITATIVE U: NEGATIVE
GLUCOSE SERPL-MCNC: 96 MG/DL
HBA1C MFR BLD HPLC: 5.6 %
HCT VFR BLD CALC: 42.6 %
HDLC SERPL-MCNC: 119 MG/DL
HGB BLD-MCNC: 13.6 G/DL
HYALINE CASTS: 0 /LPF
IMM GRANULOCYTES NFR BLD AUTO: 0 %
KETONES URINE: NEGATIVE
LDLC SERPL CALC-MCNC: 122 MG/DL
LEUKOCYTE ESTERASE URINE: NEGATIVE
LYMPHOCYTES # BLD AUTO: 1.22 K/UL
LYMPHOCYTES NFR BLD AUTO: 27.7 %
MAGNESIUM SERPL-MCNC: 2.1 MG/DL
MAN DIFF?: NORMAL
MCHC RBC-ENTMCNC: 31.9 GM/DL
MCHC RBC-ENTMCNC: 33.1 PG
MCV RBC AUTO: 103.6 FL
MICROSCOPIC-UA: NORMAL
MONOCYTES # BLD AUTO: 0.31 K/UL
MONOCYTES NFR BLD AUTO: 7 %
NEUTROPHILS # BLD AUTO: 2.74 K/UL
NEUTROPHILS NFR BLD AUTO: 62.4 %
NITRITE URINE: NEGATIVE
PH URINE: 7
PLATELET # BLD AUTO: 267 K/UL
POTASSIUM SERPL-SCNC: 4.2 MMOL/L
PROT SERPL-MCNC: 6.7 G/DL
PROTEIN URINE: NEGATIVE
RBC # BLD: 4.11 M/UL
RBC # FLD: 13.2 %
RED BLOOD CELLS URINE: 1 /HPF
SODIUM SERPL-SCNC: 142 MMOL/L
SPECIFIC GRAVITY URINE: 1.02
SQUAMOUS EPITHELIAL CELLS: 3 /HPF
T4 FREE SERPL-MCNC: 1.2 NG/DL
TRIGL SERPL-MCNC: 72 MG/DL
TSH SERPL-ACNC: 2.12 UIU/ML
URATE SERPL-MCNC: 4.1 MG/DL
UROBILINOGEN URINE: NORMAL
WBC # FLD AUTO: 4.4 K/UL
WHITE BLOOD CELLS URINE: 1 /HPF

## 2020-10-16 ENCOUNTER — TRANSCRIPTION ENCOUNTER (OUTPATIENT)
Age: 65
End: 2020-10-16

## 2020-10-16 DIAGNOSIS — R71.8 OTHER ABNORMALITY OF RED BLOOD CELLS: ICD-10-CM

## 2020-10-16 LAB
BACTERIA THROAT CULT: NORMAL
FOLATE SERPL-MCNC: 16.5 NG/ML
VIT B12 SERPL-MCNC: 935 PG/ML

## 2020-10-21 LAB — METHYLMALONATE SERPL-SCNC: 162 NMOL/L

## 2020-11-17 ENCOUNTER — OUTPATIENT (OUTPATIENT)
Dept: OUTPATIENT SERVICES | Facility: HOSPITAL | Age: 65
LOS: 1 days | Discharge: HOME | End: 2020-11-17

## 2020-11-17 DIAGNOSIS — C50.919 MALIGNANT NEOPLASM OF UNSPECIFIED SITE OF UNSPECIFIED FEMALE BREAST: ICD-10-CM

## 2020-11-23 ENCOUNTER — RESULT REVIEW (OUTPATIENT)
Age: 65
End: 2020-11-23

## 2020-11-23 ENCOUNTER — APPOINTMENT (OUTPATIENT)
Dept: HEMATOLOGY ONCOLOGY | Facility: CLINIC | Age: 65
End: 2020-11-23
Payer: COMMERCIAL

## 2020-11-23 VITALS
HEART RATE: 64 BPM | DIASTOLIC BLOOD PRESSURE: 86 MMHG | WEIGHT: 95 LBS | OXYGEN SATURATION: 100 % | TEMPERATURE: 98.1 F | SYSTOLIC BLOOD PRESSURE: 154 MMHG | BODY MASS INDEX: 18.55 KG/M2 | RESPIRATION RATE: 16 BRPM

## 2020-11-23 LAB
BASOPHILS # BLD AUTO: 0.04 K/UL — SIGNIFICANT CHANGE UP (ref 0–0.2)
BASOPHILS NFR BLD AUTO: 0.9 % — SIGNIFICANT CHANGE UP (ref 0–2)
EOSINOPHIL # BLD AUTO: 0.04 K/UL — SIGNIFICANT CHANGE UP (ref 0–0.5)
EOSINOPHIL NFR BLD AUTO: 0.9 % — SIGNIFICANT CHANGE UP (ref 0–6)
HCT VFR BLD CALC: 41.1 % — SIGNIFICANT CHANGE UP (ref 34.5–45)
HGB BLD-MCNC: 13.2 G/DL — SIGNIFICANT CHANGE UP (ref 11.5–15.5)
IMM GRANULOCYTES NFR BLD AUTO: 0.7 % — SIGNIFICANT CHANGE UP (ref 0–1.5)
LYMPHOCYTES # BLD AUTO: 1.36 K/UL — SIGNIFICANT CHANGE UP (ref 1–3.3)
LYMPHOCYTES # BLD AUTO: 29.7 % — SIGNIFICANT CHANGE UP (ref 13–44)
MCHC RBC-ENTMCNC: 32.1 G/DL — SIGNIFICANT CHANGE UP (ref 32–36)
MCHC RBC-ENTMCNC: 32.4 PG — SIGNIFICANT CHANGE UP (ref 27–34)
MCV RBC AUTO: 101 FL — HIGH (ref 80–100)
MONOCYTES # BLD AUTO: 0.41 K/UL — SIGNIFICANT CHANGE UP (ref 0–0.9)
MONOCYTES NFR BLD AUTO: 9 % — SIGNIFICANT CHANGE UP (ref 2–14)
NEUTROPHILS # BLD AUTO: 2.7 K/UL — SIGNIFICANT CHANGE UP (ref 1.8–7.4)
NEUTROPHILS NFR BLD AUTO: 58.8 % — SIGNIFICANT CHANGE UP (ref 43–77)
NRBC # BLD: 0 /100 WBCS — SIGNIFICANT CHANGE UP (ref 0–0)
PLATELET # BLD AUTO: 257 K/UL — SIGNIFICANT CHANGE UP (ref 150–400)
RBC # BLD: 4.07 M/UL — SIGNIFICANT CHANGE UP (ref 3.8–5.2)
RBC # FLD: 13.2 % — SIGNIFICANT CHANGE UP (ref 10.3–14.5)
WBC # BLD: 4.58 K/UL — SIGNIFICANT CHANGE UP (ref 3.8–10.5)
WBC # FLD AUTO: 4.58 K/UL — SIGNIFICANT CHANGE UP (ref 3.8–10.5)

## 2020-11-23 PROCEDURE — 99214 OFFICE O/P EST MOD 30 MIN: CPT

## 2020-11-23 NOTE — ASSESSMENT
[FreeTextEntry1] : The patient continues to do well and will be followed for recurrence of her breast carcinoma.  Since she has bilateral mastectomies and implants she requires no imaging.  The patient has been concerned about removing her implants because of concerns regarding lymphoma development.  It was recommended that she undergo MRIs if she develops any pain or palpable lesions in her breast.  She will return in 1 year or sooner as needed. [Curative] : Goals of care discussed with patient: Curative [Palliative Care Plan] : not applicable at this time

## 2020-11-23 NOTE — HISTORY OF PRESENT ILLNESS
[de-identified] : This is a 60-year-old woman with history of breast carcinoma. The patient has undergone bilateral mastectomies for early stage breast carcinoma T1 B. and also received chemotherapy with CMF treatment. She decided not to take hormonal treatment even though her tumors were ER positive. She continues to do well and has had no evidence of recurrence of her disease. [de-identified] : Since the last visit the pt  remains well and has no symptoms indicating recurrent disease. Pt has vertigo and does therapy.

## 2020-11-23 NOTE — REVIEW OF SYSTEMS
[Negative] : Allergic/Immunologic [FreeTextEntry7] : takes pepcid for reflux [FreeTextEntry8] : has renal calculi

## 2020-11-25 LAB
ALBUMIN SERPL ELPH-MCNC: 4.8 G/DL
ALP BLD-CCNC: 66 U/L
ALT SERPL-CCNC: 22 U/L
ANION GAP SERPL CALC-SCNC: 10 MMOL/L
AST SERPL-CCNC: 23 U/L
BILIRUB SERPL-MCNC: 0.5 MG/DL
BUN SERPL-MCNC: 16 MG/DL
CALCIUM SERPL-MCNC: 9.6 MG/DL
CANCER AG27-29 SERPL-ACNC: 25.6 U/ML
CHLORIDE SERPL-SCNC: 104 MMOL/L
CO2 SERPL-SCNC: 28 MMOL/L
CREAT SERPL-MCNC: 0.63 MG/DL
GLUCOSE SERPL-MCNC: 96 MG/DL
POTASSIUM SERPL-SCNC: 4.3 MMOL/L
PROT SERPL-MCNC: 6.7 G/DL
SODIUM SERPL-SCNC: 142 MMOL/L

## 2020-11-30 ENCOUNTER — APPOINTMENT (OUTPATIENT)
Dept: INTERNAL MEDICINE | Facility: CLINIC | Age: 65
End: 2020-11-30

## 2020-11-30 ENCOUNTER — TRANSCRIPTION ENCOUNTER (OUTPATIENT)
Age: 65
End: 2020-11-30

## 2020-12-14 ENCOUNTER — TRANSCRIPTION ENCOUNTER (OUTPATIENT)
Age: 65
End: 2020-12-14

## 2020-12-15 ENCOUNTER — TRANSCRIPTION ENCOUNTER (OUTPATIENT)
Age: 65
End: 2020-12-15

## 2020-12-16 ENCOUNTER — TRANSCRIPTION ENCOUNTER (OUTPATIENT)
Age: 65
End: 2020-12-16

## 2020-12-21 ENCOUNTER — RESULT REVIEW (OUTPATIENT)
Age: 65
End: 2020-12-21

## 2020-12-23 PROBLEM — Z87.09 HISTORY OF ACUTE PHARYNGITIS: Status: RESOLVED | Noted: 2020-10-14 | Resolved: 2020-12-23

## 2020-12-28 DIAGNOSIS — Z20.828 CONTACT WITH AND (SUSPECTED) EXPOSURE TO OTHER VIRAL COMMUNICABLE DISEASES: ICD-10-CM

## 2020-12-29 ENCOUNTER — TRANSCRIPTION ENCOUNTER (OUTPATIENT)
Age: 65
End: 2020-12-29

## 2021-01-21 ENCOUNTER — TRANSCRIPTION ENCOUNTER (OUTPATIENT)
Age: 66
End: 2021-01-21

## 2021-01-22 ENCOUNTER — TRANSCRIPTION ENCOUNTER (OUTPATIENT)
Age: 66
End: 2021-01-22

## 2021-01-25 ENCOUNTER — TRANSCRIPTION ENCOUNTER (OUTPATIENT)
Age: 66
End: 2021-01-25

## 2021-01-29 ENCOUNTER — NON-APPOINTMENT (OUTPATIENT)
Age: 66
End: 2021-01-29

## 2021-01-29 ENCOUNTER — TRANSCRIPTION ENCOUNTER (OUTPATIENT)
Age: 66
End: 2021-01-29

## 2021-01-29 ENCOUNTER — APPOINTMENT (OUTPATIENT)
Dept: INTERNAL MEDICINE | Facility: CLINIC | Age: 66
End: 2021-01-29
Payer: COMMERCIAL

## 2021-01-29 PROCEDURE — 99442: CPT

## 2021-02-03 ENCOUNTER — TRANSCRIPTION ENCOUNTER (OUTPATIENT)
Age: 66
End: 2021-02-03

## 2021-02-25 ENCOUNTER — APPOINTMENT (OUTPATIENT)
Dept: INTERNAL MEDICINE | Facility: CLINIC | Age: 66
End: 2021-02-25

## 2021-03-03 ENCOUNTER — NON-APPOINTMENT (OUTPATIENT)
Age: 66
End: 2021-03-03

## 2021-03-03 ENCOUNTER — TRANSCRIPTION ENCOUNTER (OUTPATIENT)
Age: 66
End: 2021-03-03

## 2021-03-09 ENCOUNTER — TRANSCRIPTION ENCOUNTER (OUTPATIENT)
Age: 66
End: 2021-03-09

## 2021-03-25 ENCOUNTER — TRANSCRIPTION ENCOUNTER (OUTPATIENT)
Age: 66
End: 2021-03-25

## 2021-05-28 ENCOUNTER — TRANSCRIPTION ENCOUNTER (OUTPATIENT)
Age: 66
End: 2021-05-28

## 2021-06-28 ENCOUNTER — APPOINTMENT (OUTPATIENT)
Dept: INTERNAL MEDICINE | Facility: CLINIC | Age: 66
End: 2021-06-28
Payer: COMMERCIAL

## 2021-06-28 DIAGNOSIS — H81.4 VERTIGO OF CENTRAL ORIGIN: ICD-10-CM

## 2021-06-28 DIAGNOSIS — R42 DIZZINESS AND GIDDINESS: ICD-10-CM

## 2021-06-28 PROCEDURE — 99213 OFFICE O/P EST LOW 20 MIN: CPT | Mod: 95

## 2021-06-29 PROBLEM — R42 DIZZY: Status: ACTIVE | Noted: 2020-07-16

## 2021-06-29 RX ORDER — IRBESARTAN 75 MG/1
75 TABLET ORAL
Qty: 90 | Refills: 1 | Status: DISCONTINUED | COMMUNITY
Start: 2020-02-21 | End: 2021-06-29

## 2021-06-29 NOTE — ASSESSMENT
[FreeTextEntry1] : I told patient for now she could stop the irbesartan and self taper itself over several weeks. I told her I did not think this was causing her unsteadiness but we would see what happens. She should monitor her blood pressure daily and if it goes above 140/90 she will let me know. We discussed the importance of lifestyle in terms of salt restriction and regular exercise to help her blood pressureShe will continue with vestibular therapy

## 2021-06-29 NOTE — HISTORY OF PRESENT ILLNESS
[Home] : at home, [unfilled] , at the time of the visit. [Medical Office: ([facility])___] : at the medical office located in  [FreeTextEntry1] : Hypertension \par Dizziness [de-identified] : Patient states she had been  having issues with positional vertigo for several months .  It finally resolved but now it is recurring.   She also feels she is unsteady on her feet when she gets up in the middle of the night. She feels that irbesartan is contributing and she stopped it 3 days ago. She has been monitoring her blood pressure and it's been 117/75 and 122/88 and she wants to know if she can stay off the medication or could taper this.  She has tried to be careful with salt restriction in her diet.  She states that she is recovering from her recent breast implant replacement of textured implants and her blood pressure was fine during the procedure

## 2021-06-29 NOTE — PHYSICAL EXAM
[No Acute Distress] : no acute distress [Well Nourished] : well nourished [Well Developed] : well developed [Well-Appearing] : well-appearing [Normal Voice/Communication] : normal voice/communication [No Respiratory Distress] : no respiratory distress  [No Accessory Muscle Use] : no accessory muscle use [Speech Grossly Normal] : speech grossly normal [Memory Grossly Normal] : memory grossly normal [Normal Affect] : the affect was normal [Alert and Oriented x3] : oriented to person, place, and time [Normal Mood] : the mood was normal [Normal Insight/Judgement] : insight and judgment were intact [de-identified] : Face symmetric moves upper extremities speech clear no visible nystagmus

## 2021-09-20 ENCOUNTER — APPOINTMENT (OUTPATIENT)
Dept: ULTRASOUND IMAGING | Facility: CLINIC | Age: 66
End: 2021-09-20

## 2021-10-27 ENCOUNTER — TRANSCRIPTION ENCOUNTER (OUTPATIENT)
Age: 66
End: 2021-10-27

## 2021-11-15 ENCOUNTER — APPOINTMENT (OUTPATIENT)
Dept: RADIOLOGY | Facility: IMAGING CENTER | Age: 66
End: 2021-11-15

## 2021-11-15 ENCOUNTER — APPOINTMENT (OUTPATIENT)
Dept: INTERNAL MEDICINE | Facility: CLINIC | Age: 66
End: 2021-11-15
Payer: COMMERCIAL

## 2021-11-15 ENCOUNTER — APPOINTMENT (OUTPATIENT)
Dept: ORTHOPEDIC SURGERY | Facility: CLINIC | Age: 66
End: 2021-11-15
Payer: COMMERCIAL

## 2021-11-15 VITALS — BODY MASS INDEX: 18.75 KG/M2 | WEIGHT: 96 LBS

## 2021-11-15 VITALS — WEIGHT: 95 LBS | HEIGHT: 60 IN | BODY MASS INDEX: 18.65 KG/M2

## 2021-11-15 DIAGNOSIS — S80.00XA CONTUSION OF UNSPECIFIED KNEE, INITIAL ENCOUNTER: ICD-10-CM

## 2021-11-15 DIAGNOSIS — M25.562 PAIN IN RIGHT KNEE: ICD-10-CM

## 2021-11-15 DIAGNOSIS — M25.561 PAIN IN RIGHT KNEE: ICD-10-CM

## 2021-11-15 PROCEDURE — 73564 X-RAY EXAM KNEE 4 OR MORE: CPT | Mod: 50

## 2021-11-15 PROCEDURE — 99214 OFFICE O/P EST MOD 30 MIN: CPT

## 2021-11-15 PROCEDURE — 90662 IIV NO PRSV INCREASED AG IM: CPT

## 2021-11-15 PROCEDURE — G0008: CPT

## 2021-11-15 NOTE — DISCUSSION/SUMMARY
[de-identified] : The underlying pathophysiology was reviewed with the patient. XR films were reviewed with the patient. Discussed at length the nature of the patient’s condition. The bilateral knee symptoms appear secondary to contusion.\par \par She was advised of activity modification. \par We discussed that she should wait to return to activities such as horseback riding for the next 2-3 weeks until her symptoms resolve.\par She may use topical analgesics as needed.\par NSAIDs prn.\par \par All questions answered, understanding verbalized. Patient in agreement with plan of care. Follow up as needed.

## 2021-11-15 NOTE — HISTORY OF PRESENT ILLNESS
[de-identified] : Pt is a 65 y/o female c/o bilateral knee pain x 2 days.  She slipped and fell on wet pavement outside of a restaurant on Saturday.  She landed on both knees.  She has abrasions on her right knee.  The knees were swollen initially.  She has been icing the knees which helps.  She use arnica gel and has also taken Advil twice on Sunday as well as Tylenol as of this morning. She still has pain with ambulation.  It is very difficult to go up and down stairs.

## 2021-11-15 NOTE — ADDENDUM
[FreeTextEntry1] : I, Concetta Mohan wrote this note acting as a scribe for Dr. Vitor Arce on Nov 15, 2021.

## 2021-11-15 NOTE — END OF VISIT
[FreeTextEntry3] : All medical record entries made by the Scribe were at my,  Dr. Vitor Arce MD., direction and personally dictated by me on 11/15/2021. I have personally reviewed the chart and agree that the record accurately reflects my personal performance of the history, physical exam, assessment and plan.

## 2021-11-15 NOTE — PHYSICAL EXAM
[de-identified] : Patient is WDWN, alert, and in no acute distress. Breathing is unlabored. She is grossly oriented to person, place, and time.\par \par Right knee: \par There is no medial/lateral joint line tenderness to palpation. Abrasions noted\par Range of motion: Active flexion and extension are full and painless. No crepitus.\par Tests and Signs: All tests for stability are normal. \par Strength: flexion and extension 5/5 \par \par Left knee: \par There is no medial/lateral joint line tenderness to palpation. \par Range of motion: Active flexion and extension are full and painless. No crepitus.\par Tests and Signs: All tests for stability are normal. \par Strength: flexion and extension 5/5  [de-identified] : AP, lateral, skyline, and tunnel views of the bilateral knees were obtained and revealed no abnormalities. No acute fracture. No dislocation. Cartilage spaces are maintained.

## 2021-11-30 ENCOUNTER — OUTPATIENT (OUTPATIENT)
Dept: OUTPATIENT SERVICES | Facility: HOSPITAL | Age: 66
LOS: 1 days | Discharge: HOME | End: 2021-11-30

## 2021-11-30 DIAGNOSIS — C50.919 MALIGNANT NEOPLASM OF UNSPECIFIED SITE OF UNSPECIFIED FEMALE BREAST: ICD-10-CM

## 2021-12-01 ENCOUNTER — APPOINTMENT (OUTPATIENT)
Dept: HEMATOLOGY ONCOLOGY | Facility: CLINIC | Age: 66
End: 2021-12-01
Payer: COMMERCIAL

## 2021-12-01 VITALS
WEIGHT: 95.99 LBS | SYSTOLIC BLOOD PRESSURE: 153 MMHG | TEMPERATURE: 97.6 F | BODY MASS INDEX: 18.75 KG/M2 | DIASTOLIC BLOOD PRESSURE: 84 MMHG | HEART RATE: 67 BPM | OXYGEN SATURATION: 99 % | RESPIRATION RATE: 16 BRPM

## 2021-12-01 PROCEDURE — 99214 OFFICE O/P EST MOD 30 MIN: CPT

## 2021-12-01 NOTE — PHYSICAL EXAM
[Normal] : affect appropriate [de-identified] : Bilateral mastectomies and implants recently replaced.

## 2021-12-01 NOTE — HISTORY OF PRESENT ILLNESS
[de-identified] : This is a 60-year-old woman with history of breast carcinoma. The patient has undergone bilateral mastectomies in 2002  for early stage breast carcinoma T1 B. and also received chemotherapy with CMF treatment. She decided not to take hormonal treatment even though her tumors were ER positive. She continues to do well and has had no evidence of recurrence of her disease. [de-identified] : Since the last visit the pt remains well and has no new symptoms indicating recurrent disease.

## 2021-12-01 NOTE — ASSESSMENT
[FreeTextEntry1] : The patient continues to do well 19 years after diagnosis of breast carcinoma T1BN0 status post lumpectomy bilateral mastectomies and chemotherapy with CMF. The patient did not want to take hormonal therapy even though her tumor was ER positive. She has had no evidence of recurrence of her disease. She continues medical, GI and GYN follow-up and testing. She recently had the textured implants replaced and now she feels more comfortable with the new implants. She will continue colonoscopy, bone density and surveillance. She will return to the office in 1 year or sooner as needed. [Curative] : Goals of care discussed with patient: Curative [Palliative Care Plan] : not applicable at this time

## 2021-12-14 ENCOUNTER — APPOINTMENT (OUTPATIENT)
Dept: GASTROENTEROLOGY | Facility: CLINIC | Age: 66
End: 2021-12-14
Payer: COMMERCIAL

## 2021-12-14 VITALS
HEART RATE: 77 BPM | BODY MASS INDEX: 18.85 KG/M2 | SYSTOLIC BLOOD PRESSURE: 128 MMHG | OXYGEN SATURATION: 99 % | WEIGHT: 96 LBS | HEIGHT: 60 IN | DIASTOLIC BLOOD PRESSURE: 72 MMHG | TEMPERATURE: 97.7 F | RESPIRATION RATE: 15 BRPM

## 2021-12-14 PROCEDURE — 99203 OFFICE O/P NEW LOW 30 MIN: CPT

## 2021-12-16 ENCOUNTER — TRANSCRIPTION ENCOUNTER (OUTPATIENT)
Age: 66
End: 2021-12-16

## 2022-01-10 ENCOUNTER — OUTPATIENT (OUTPATIENT)
Dept: OUTPATIENT SERVICES | Facility: HOSPITAL | Age: 67
LOS: 1 days | Discharge: HOME | End: 2022-01-10

## 2022-01-10 ENCOUNTER — TRANSCRIPTION ENCOUNTER (OUTPATIENT)
Age: 67
End: 2022-01-10

## 2022-01-10 DIAGNOSIS — C50.919 MALIGNANT NEOPLASM OF UNSPECIFIED SITE OF UNSPECIFIED FEMALE BREAST: ICD-10-CM

## 2022-01-11 ENCOUNTER — APPOINTMENT (OUTPATIENT)
Dept: HEMATOLOGY ONCOLOGY | Facility: CLINIC | Age: 67
End: 2022-01-11

## 2022-01-11 ENCOUNTER — RESULT REVIEW (OUTPATIENT)
Age: 67
End: 2022-01-11

## 2022-01-11 ENCOUNTER — LABORATORY RESULT (OUTPATIENT)
Age: 67
End: 2022-01-11

## 2022-01-11 LAB
BASOPHILS # BLD AUTO: 0.06 K/UL — SIGNIFICANT CHANGE UP (ref 0–0.2)
BASOPHILS NFR BLD AUTO: 1.4 % — SIGNIFICANT CHANGE UP (ref 0–2)
EOSINOPHIL # BLD AUTO: 0.12 K/UL — SIGNIFICANT CHANGE UP (ref 0–0.5)
EOSINOPHIL NFR BLD AUTO: 2.8 % — SIGNIFICANT CHANGE UP (ref 0–6)
HCT VFR BLD CALC: 45.9 % — HIGH (ref 34.5–45)
HGB BLD-MCNC: 15 G/DL — SIGNIFICANT CHANGE UP (ref 11.5–15.5)
IMM GRANULOCYTES NFR BLD AUTO: 0.2 % — SIGNIFICANT CHANGE UP (ref 0–1.5)
LYMPHOCYTES # BLD AUTO: 1.56 K/UL — SIGNIFICANT CHANGE UP (ref 1–3.3)
LYMPHOCYTES # BLD AUTO: 36.3 % — SIGNIFICANT CHANGE UP (ref 13–44)
MCHC RBC-ENTMCNC: 32.7 G/DL — SIGNIFICANT CHANGE UP (ref 32–36)
MCHC RBC-ENTMCNC: 33.2 PG — SIGNIFICANT CHANGE UP (ref 27–34)
MCV RBC AUTO: 101.5 FL — HIGH (ref 80–100)
MONOCYTES # BLD AUTO: 0.33 K/UL — SIGNIFICANT CHANGE UP (ref 0–0.9)
MONOCYTES NFR BLD AUTO: 7.7 % — SIGNIFICANT CHANGE UP (ref 2–14)
NEUTROPHILS # BLD AUTO: 2.22 K/UL — SIGNIFICANT CHANGE UP (ref 1.8–7.4)
NEUTROPHILS NFR BLD AUTO: 51.6 % — SIGNIFICANT CHANGE UP (ref 43–77)
NRBC # BLD: 0 /100 WBCS — SIGNIFICANT CHANGE UP (ref 0–0)
PLATELET # BLD AUTO: 242 K/UL — SIGNIFICANT CHANGE UP (ref 150–400)
RBC # BLD: 4.52 M/UL — SIGNIFICANT CHANGE UP (ref 3.8–5.2)
RBC # FLD: 13.2 % — SIGNIFICANT CHANGE UP (ref 10.3–14.5)
WBC # BLD: 4.3 K/UL — SIGNIFICANT CHANGE UP (ref 3.8–10.5)
WBC # FLD AUTO: 4.3 K/UL — SIGNIFICANT CHANGE UP (ref 3.8–10.5)

## 2022-01-12 LAB
CANCER AG125 SERPL-ACNC: 11 U/ML
CANCER AG27-29 SERPL-ACNC: 28.1 U/ML
ESTIMATED AVERAGE GLUCOSE: 111 MG/DL
HBA1C MFR BLD HPLC: 5.5 %

## 2022-01-13 ENCOUNTER — TRANSCRIPTION ENCOUNTER (OUTPATIENT)
Age: 67
End: 2022-01-13

## 2022-01-18 ENCOUNTER — MED ADMIN CHARGE (OUTPATIENT)
Age: 67
End: 2022-01-18

## 2022-01-21 ENCOUNTER — TRANSCRIPTION ENCOUNTER (OUTPATIENT)
Age: 67
End: 2022-01-21

## 2022-01-24 ENCOUNTER — NON-APPOINTMENT (OUTPATIENT)
Age: 67
End: 2022-01-24

## 2022-01-26 ENCOUNTER — TRANSCRIPTION ENCOUNTER (OUTPATIENT)
Age: 67
End: 2022-01-26

## 2022-01-26 ENCOUNTER — NON-APPOINTMENT (OUTPATIENT)
Age: 67
End: 2022-01-26

## 2022-02-01 ENCOUNTER — NON-APPOINTMENT (OUTPATIENT)
Age: 67
End: 2022-02-01

## 2022-02-02 ENCOUNTER — RESULT REVIEW (OUTPATIENT)
Age: 67
End: 2022-02-02

## 2022-02-02 ENCOUNTER — OUTPATIENT (OUTPATIENT)
Dept: OUTPATIENT SERVICES | Facility: HOSPITAL | Age: 67
LOS: 1 days | Discharge: HOME | End: 2022-02-02
Payer: COMMERCIAL

## 2022-02-02 ENCOUNTER — APPOINTMENT (OUTPATIENT)
Dept: GASTROENTEROLOGY | Facility: HOSPITAL | Age: 67
End: 2022-02-02

## 2022-02-02 VITALS
WEIGHT: 95.02 LBS | SYSTOLIC BLOOD PRESSURE: 174 MMHG | HEART RATE: 76 BPM | DIASTOLIC BLOOD PRESSURE: 83 MMHG | HEIGHT: 61 IN | TEMPERATURE: 97 F | RESPIRATION RATE: 17 BRPM | OXYGEN SATURATION: 98 %

## 2022-02-02 VITALS
SYSTOLIC BLOOD PRESSURE: 158 MMHG | HEART RATE: 58 BPM | DIASTOLIC BLOOD PRESSURE: 73 MMHG | RESPIRATION RATE: 20 BRPM | OXYGEN SATURATION: 100 %

## 2022-02-02 DIAGNOSIS — Z12.11 ENCOUNTER FOR SCREENING FOR MALIGNANT NEOPLASM OF COLON: ICD-10-CM

## 2022-02-02 PROCEDURE — 88305 TISSUE EXAM BY PATHOLOGIST: CPT | Mod: 26

## 2022-02-02 PROCEDURE — 43239 EGD BIOPSY SINGLE/MULTIPLE: CPT | Mod: GC

## 2022-02-02 PROCEDURE — 45385 COLONOSCOPY W/LESION REMOVAL: CPT | Mod: GC

## 2022-02-02 DEVICE — SYR ORISE GEL TWIN 23G 10ML: Type: IMPLANTABLE DEVICE | Status: FUNCTIONAL

## 2022-02-02 DEVICE — CLIP RESOLUTION 360 ULTRA 235CM 20/BX: Type: IMPLANTABLE DEVICE | Status: FUNCTIONAL

## 2022-02-02 NOTE — ASU PATIENT PROFILE, ADULT - FALL HARM RISK - UNIVERSAL INTERVENTIONS
Bed in lowest position, wheels locked, appropriate side rails in place/Call bell, personal items and telephone in reach/Instruct patient to call for assistance before getting out of bed or chair/Non-slip footwear when patient is out of bed/[place] to call system/Physically safe environment - no spills, clutter or unnecessary equipment/Purposeful Proactive Rounding/Room/bathroom lighting operational, light cord in reach

## 2022-02-02 NOTE — ASU PREOP CHECKLIST - ALLERGY BAND ON
ORIENTATION: Alert, nonverbal, HECTOR orientation  BEHAVIOR & AGGRESSION TOOL COLOR: Green  ABNL VS/O2: VSS on room air, LS diminished, on scheduled nebs  MOBILITY: Total, lift  DIET: NPO, TF from 7A-7P via J tube  BOWEL/BLADDER: Incontinent of bowel and bladder  DRAIN/DEVICES: G/J tube  SKIN: pale skin. Coccyx red, blanchable, mepilex in place  D/C DAY/GOALS/PLACE:Possible discharge in 1-2 days  OTHER IMPORTANT INFO: Aspiration precautions, contact precautions, potassium 3.2, replaced per protocol, recheck will be at 8pm, continues on IV abx.   done

## 2022-02-03 ENCOUNTER — NON-APPOINTMENT (OUTPATIENT)
Age: 67
End: 2022-02-03

## 2022-02-04 ENCOUNTER — APPOINTMENT (OUTPATIENT)
Dept: INTERNAL MEDICINE | Facility: CLINIC | Age: 67
End: 2022-02-04
Payer: COMMERCIAL

## 2022-02-04 ENCOUNTER — NON-APPOINTMENT (OUTPATIENT)
Age: 67
End: 2022-02-04

## 2022-02-04 VITALS
WEIGHT: 96 LBS | BODY MASS INDEX: 18.85 KG/M2 | DIASTOLIC BLOOD PRESSURE: 90 MMHG | SYSTOLIC BLOOD PRESSURE: 160 MMHG | HEIGHT: 60 IN | OXYGEN SATURATION: 98 % | HEART RATE: 79 BPM | TEMPERATURE: 98.2 F

## 2022-02-04 VITALS — DIASTOLIC BLOOD PRESSURE: 100 MMHG | SYSTOLIC BLOOD PRESSURE: 144 MMHG

## 2022-02-04 LAB — SURGICAL PATHOLOGY STUDY: SIGNIFICANT CHANGE UP

## 2022-02-04 PROCEDURE — 99214 OFFICE O/P EST MOD 30 MIN: CPT

## 2022-02-04 RX ORDER — PEG-3350, SODIUM SULFATE, SODIUM CHLORIDE, POTASSIUM CHLORIDE, SODIUM ASCORBATE AND ASCORBIC ACID 7.5-2.691G
100 KIT ORAL
Qty: 1 | Refills: 0 | Status: DISCONTINUED | COMMUNITY
Start: 2021-12-14 | End: 2022-02-04

## 2022-02-04 RX ORDER — BIOTIN 10 MG
TABLET ORAL
Refills: 0 | Status: DISCONTINUED | COMMUNITY
End: 2022-02-04

## 2022-02-04 RX ORDER — CALCIUM CARBONATE 500(1250)
500 TABLET ORAL DAILY
Refills: 0 | Status: DISCONTINUED | COMMUNITY
End: 2022-02-04

## 2022-02-04 RX ORDER — LORATADINE 5 MG/5 ML
10 SOLUTION, ORAL ORAL
Qty: 30 | Refills: 0 | Status: DISCONTINUED | COMMUNITY
End: 2022-02-04

## 2022-02-04 RX ORDER — BILBERRY
425 CAPSULE ORAL
Refills: 0 | Status: DISCONTINUED | COMMUNITY
End: 2022-02-04

## 2022-02-04 RX ORDER — ZINC SULFATE 50(220)MG
CAPSULE ORAL DAILY
Refills: 0 | Status: DISCONTINUED | COMMUNITY
End: 2022-02-04

## 2022-02-04 NOTE — HISTORY OF PRESENT ILLNESS
[FreeTextEntry1] : bp check [de-identified] : QI ESQUIVEL is a 67 yo woman with history of breast cancer, hypertension here for a bp check.  Was on irbesartan in the past.  Has lots of stress.  Recently had egd and colonoscopy. BP was elevated.  BP is elevated in the office today. Denies headache, chest pain, sob.\par \par Feeling some stress overall\par \par The patient is  with children.  She works in corporate travel.  She would have no difficulty walking 4 to 6 blocks or 2 flights of stairs.

## 2022-02-06 ENCOUNTER — RESULT REVIEW (OUTPATIENT)
Age: 67
End: 2022-02-06

## 2022-02-07 ENCOUNTER — NON-APPOINTMENT (OUTPATIENT)
Age: 67
End: 2022-02-07

## 2022-02-07 ENCOUNTER — TRANSCRIPTION ENCOUNTER (OUTPATIENT)
Age: 67
End: 2022-02-07

## 2022-02-08 ENCOUNTER — TRANSCRIPTION ENCOUNTER (OUTPATIENT)
Age: 67
End: 2022-02-08

## 2022-02-18 ENCOUNTER — APPOINTMENT (OUTPATIENT)
Dept: INTERNAL MEDICINE | Facility: CLINIC | Age: 67
End: 2022-02-18
Payer: COMMERCIAL

## 2022-02-18 VITALS
BODY MASS INDEX: 18.85 KG/M2 | HEART RATE: 82 BPM | DIASTOLIC BLOOD PRESSURE: 100 MMHG | WEIGHT: 96 LBS | TEMPERATURE: 97.6 F | HEIGHT: 60 IN | SYSTOLIC BLOOD PRESSURE: 198 MMHG | OXYGEN SATURATION: 98 %

## 2022-02-18 VITALS — SYSTOLIC BLOOD PRESSURE: 158 MMHG | DIASTOLIC BLOOD PRESSURE: 90 MMHG

## 2022-02-18 DIAGNOSIS — Z23 ENCOUNTER FOR IMMUNIZATION: ICD-10-CM

## 2022-02-18 PROCEDURE — 99214 OFFICE O/P EST MOD 30 MIN: CPT | Mod: 25

## 2022-02-18 PROCEDURE — 36415 COLL VENOUS BLD VENIPUNCTURE: CPT

## 2022-02-18 NOTE — HISTORY OF PRESENT ILLNESS
[FreeTextEntry1] : bp check [de-identified] : QI ESQUIVEL is a 67 yo woman with history of breast cancer, hypertension here for a bp check.  Now on irbesartan.  Has lots of stress.  Had recent heated argument with daughter  BP is elevated in the office today. Denies headache, chest pain, sob.\par \par Feeling some stress overall\par \par The patient is  with children.  She works in corporate travel.  She would have no difficulty walking 4 to 6 blocks or 2 flights of stairs.

## 2022-02-23 ENCOUNTER — APPOINTMENT (OUTPATIENT)
Dept: RADIOLOGY | Facility: IMAGING CENTER | Age: 67
End: 2022-02-23
Payer: COMMERCIAL

## 2022-02-23 ENCOUNTER — OUTPATIENT (OUTPATIENT)
Dept: OUTPATIENT SERVICES | Facility: HOSPITAL | Age: 67
LOS: 1 days | End: 2022-02-23
Payer: COMMERCIAL

## 2022-02-23 DIAGNOSIS — M81.0 AGE-RELATED OSTEOPOROSIS WITHOUT CURRENT PATHOLOGICAL FRACTURE: ICD-10-CM

## 2022-02-23 PROCEDURE — 77085 DXA BONE DENSITY AXL VRT FX: CPT | Mod: 26

## 2022-02-23 PROCEDURE — 77085 DXA BONE DENSITY AXL VRT FX: CPT

## 2022-02-24 LAB
ALBUMIN SERPL ELPH-MCNC: 4.7 G/DL
ALP BLD-CCNC: 68 U/L
ALT SERPL-CCNC: 19 U/L
ANION GAP SERPL CALC-SCNC: 10 MMOL/L
AST SERPL-CCNC: 22 U/L
BILIRUB SERPL-MCNC: 0.3 MG/DL
BUN SERPL-MCNC: 20 MG/DL
CALCIUM SERPL-MCNC: 9.9 MG/DL
CHLORIDE SERPL-SCNC: 105 MMOL/L
CO2 SERPL-SCNC: 28 MMOL/L
CREAT SERPL-MCNC: 0.71 MG/DL
GLUCOSE SERPL-MCNC: 94 MG/DL
POTASSIUM SERPL-SCNC: 4.4 MMOL/L
PROT SERPL-MCNC: 6.9 G/DL
SODIUM SERPL-SCNC: 143 MMOL/L

## 2022-05-25 ENCOUNTER — APPOINTMENT (OUTPATIENT)
Dept: INTERNAL MEDICINE | Facility: CLINIC | Age: 67
End: 2022-05-25
Payer: COMMERCIAL

## 2022-05-25 VITALS
HEART RATE: 64 BPM | WEIGHT: 96 LBS | BODY MASS INDEX: 18.85 KG/M2 | DIASTOLIC BLOOD PRESSURE: 94 MMHG | SYSTOLIC BLOOD PRESSURE: 154 MMHG | TEMPERATURE: 97.3 F | HEIGHT: 60 IN | OXYGEN SATURATION: 98 %

## 2022-05-25 VITALS — DIASTOLIC BLOOD PRESSURE: 82 MMHG | SYSTOLIC BLOOD PRESSURE: 142 MMHG

## 2022-05-25 DIAGNOSIS — I10 ESSENTIAL (PRIMARY) HYPERTENSION: ICD-10-CM

## 2022-05-25 PROCEDURE — 99213 OFFICE O/P EST LOW 20 MIN: CPT | Mod: 25

## 2022-05-25 PROCEDURE — 90471 IMMUNIZATION ADMIN: CPT

## 2022-05-25 PROCEDURE — 90750 HZV VACC RECOMBINANT IM: CPT

## 2022-05-25 NOTE — HISTORY OF PRESENT ILLNESS
[FreeTextEntry1] : bp check [de-identified] : QI ESQUIVEL is a 65 yo woman with history of breast cancer, hypertension here for a bp check.  On irbesartan and bp has decreased on home bp monitor.  Has lots of stress.  Denies headache, chest pain, sob.\par \par Wishes to have shingrix #2\par \par The patient is  with children.  She works in corporate travel.  She would have no difficulty walking 4 to 6 blocks or 2 flights of stairs.

## 2022-06-30 ENCOUNTER — NON-APPOINTMENT (OUTPATIENT)
Age: 67
End: 2022-06-30

## 2022-07-02 ENCOUNTER — NON-APPOINTMENT (OUTPATIENT)
Age: 67
End: 2022-07-02

## 2022-07-18 ENCOUNTER — TRANSCRIPTION ENCOUNTER (OUTPATIENT)
Age: 67
End: 2022-07-18

## 2022-10-10 ENCOUNTER — OUTPATIENT (OUTPATIENT)
Dept: OUTPATIENT SERVICES | Facility: HOSPITAL | Age: 67
LOS: 1 days | Discharge: HOME | End: 2022-10-10

## 2022-10-10 DIAGNOSIS — C50.919 MALIGNANT NEOPLASM OF UNSPECIFIED SITE OF UNSPECIFIED FEMALE BREAST: ICD-10-CM

## 2022-11-29 ENCOUNTER — APPOINTMENT (OUTPATIENT)
Dept: INTERNAL MEDICINE | Facility: CLINIC | Age: 67
End: 2022-11-29

## 2022-12-08 ENCOUNTER — NON-APPOINTMENT (OUTPATIENT)
Age: 67
End: 2022-12-08

## 2022-12-13 ENCOUNTER — NON-APPOINTMENT (OUTPATIENT)
Age: 67
End: 2022-12-13

## 2022-12-22 ENCOUNTER — OUTPATIENT (OUTPATIENT)
Dept: OUTPATIENT SERVICES | Facility: HOSPITAL | Age: 67
LOS: 1 days | Discharge: HOME | End: 2022-12-22

## 2022-12-22 DIAGNOSIS — C50.919 MALIGNANT NEOPLASM OF UNSPECIFIED SITE OF UNSPECIFIED FEMALE BREAST: ICD-10-CM

## 2023-01-03 ENCOUNTER — APPOINTMENT (OUTPATIENT)
Dept: HEMATOLOGY ONCOLOGY | Facility: CLINIC | Age: 68
End: 2023-01-03
Payer: COMMERCIAL

## 2023-01-03 ENCOUNTER — NON-APPOINTMENT (OUTPATIENT)
Age: 68
End: 2023-01-03

## 2023-01-03 VITALS
BODY MASS INDEX: 18.75 KG/M2 | RESPIRATION RATE: 15 BRPM | DIASTOLIC BLOOD PRESSURE: 87 MMHG | WEIGHT: 96 LBS | HEART RATE: 63 BPM | SYSTOLIC BLOOD PRESSURE: 161 MMHG | TEMPERATURE: 96.8 F | OXYGEN SATURATION: 98 %

## 2023-01-03 PROCEDURE — 99213 OFFICE O/P EST LOW 20 MIN: CPT

## 2023-01-04 NOTE — ASSESSMENT
[Curative] : Goals of care discussed with patient: Curative [Palliative Care Plan] : not applicable at this time [FreeTextEntry1] : The patient continues to do well 20 years postdiagnosis of breast carcinoma status post bilateral mastectomy and chemotherapy with CMF.  Even though the tumors were ER positive the patient decided not to take hormonal therapy.  She has been well and has had no signs or symptoms indicating recurrent disease.  She will continue medical, GI, GYN follow-up and testing.  She will undergo colonoscopy, bone density and cholesterol testing.  The patient will return to the office in 1 year or sooner as needed.

## 2023-01-04 NOTE — HISTORY OF PRESENT ILLNESS
[de-identified] : This is a 60-year-old woman with history of breast carcinoma. The patient has undergone bilateral mastectomies in 2002  for early stage breast carcinoma T1 B. and also received chemotherapy with CMF treatment. She decided not to take hormonal treatment even though her tumors were ER positive. She continues to do well and has had no evidence of recurrence of her disease. 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 [de-identified] : Since the last visit the pt remains well and has no symptoms indicating

## 2023-01-04 NOTE — REVIEW OF SYSTEMS
[Negative] : Allergic/Immunologic [FreeTextEntry7] : occ heartburn [FreeTextEntry8] : has renal calculi [FreeTextEntry9] : exercises daily. Pt on on ostinol5  x for osteoporosis, treadmill [de-identified] : has dermal exam

## 2023-01-07 ENCOUNTER — NON-APPOINTMENT (OUTPATIENT)
Age: 68
End: 2023-01-07

## 2023-01-17 RX ORDER — IRBESARTAN 75 MG/1
75 TABLET ORAL
Qty: 90 | Refills: 1 | Status: ACTIVE | COMMUNITY
Start: 2022-02-18 | End: 1900-01-01

## 2023-01-31 ENCOUNTER — APPOINTMENT (OUTPATIENT)
Dept: GASTROENTEROLOGY | Facility: CLINIC | Age: 68
End: 2023-01-31

## 2023-03-15 ENCOUNTER — TRANSCRIPTION ENCOUNTER (OUTPATIENT)
Age: 68
End: 2023-03-15

## 2023-03-16 ENCOUNTER — TRANSCRIPTION ENCOUNTER (OUTPATIENT)
Age: 68
End: 2023-03-16

## 2023-03-20 ENCOUNTER — TRANSCRIPTION ENCOUNTER (OUTPATIENT)
Age: 68
End: 2023-03-20

## 2023-03-22 ENCOUNTER — APPOINTMENT (OUTPATIENT)
Dept: HEMATOLOGY ONCOLOGY | Facility: CLINIC | Age: 68
End: 2023-03-22

## 2023-03-22 ENCOUNTER — RESULT REVIEW (OUTPATIENT)
Age: 68
End: 2023-03-22

## 2023-03-22 ENCOUNTER — OUTPATIENT (OUTPATIENT)
Dept: OUTPATIENT SERVICES | Facility: HOSPITAL | Age: 68
LOS: 1 days | Discharge: HOME | End: 2023-03-22

## 2023-03-22 DIAGNOSIS — C50.919 MALIGNANT NEOPLASM OF UNSPECIFIED SITE OF UNSPECIFIED FEMALE BREAST: ICD-10-CM

## 2023-03-22 LAB
BASOPHILS # BLD AUTO: 0.05 K/UL — SIGNIFICANT CHANGE UP (ref 0–0.2)
BASOPHILS NFR BLD AUTO: 1.4 % — SIGNIFICANT CHANGE UP (ref 0–2)
EOSINOPHIL # BLD AUTO: 0.12 K/UL — SIGNIFICANT CHANGE UP (ref 0–0.5)
EOSINOPHIL NFR BLD AUTO: 3.3 % — SIGNIFICANT CHANGE UP (ref 0–6)
HCT VFR BLD CALC: 40.5 % — SIGNIFICANT CHANGE UP (ref 34.5–45)
HGB BLD-MCNC: 13.2 G/DL — SIGNIFICANT CHANGE UP (ref 11.5–15.5)
IMM GRANULOCYTES NFR BLD AUTO: 0.3 % — SIGNIFICANT CHANGE UP (ref 0–0.9)
LYMPHOCYTES # BLD AUTO: 1.09 K/UL — SIGNIFICANT CHANGE UP (ref 1–3.3)
LYMPHOCYTES # BLD AUTO: 29.9 % — SIGNIFICANT CHANGE UP (ref 13–44)
MCHC RBC-ENTMCNC: 32.6 G/DL — SIGNIFICANT CHANGE UP (ref 32–36)
MCHC RBC-ENTMCNC: 32.8 PG — SIGNIFICANT CHANGE UP (ref 27–34)
MCV RBC AUTO: 100.5 FL — HIGH (ref 80–100)
MONOCYTES # BLD AUTO: 0.29 K/UL — SIGNIFICANT CHANGE UP (ref 0–0.9)
MONOCYTES NFR BLD AUTO: 7.9 % — SIGNIFICANT CHANGE UP (ref 2–14)
NEUTROPHILS # BLD AUTO: 2.09 K/UL — SIGNIFICANT CHANGE UP (ref 1.8–7.4)
NEUTROPHILS NFR BLD AUTO: 57.2 % — SIGNIFICANT CHANGE UP (ref 43–77)
NRBC # BLD: 0 /100 WBCS — SIGNIFICANT CHANGE UP (ref 0–0)
PLATELET # BLD AUTO: 240 K/UL — SIGNIFICANT CHANGE UP (ref 150–400)
RBC # BLD: 4.03 M/UL — SIGNIFICANT CHANGE UP (ref 3.8–5.2)
RBC # FLD: 13.6 % — SIGNIFICANT CHANGE UP (ref 10.3–14.5)
WBC # BLD: 3.65 K/UL — LOW (ref 3.8–10.5)
WBC # FLD AUTO: 3.65 K/UL — LOW (ref 3.8–10.5)

## 2023-03-23 ENCOUNTER — TRANSCRIPTION ENCOUNTER (OUTPATIENT)
Age: 68
End: 2023-03-23

## 2023-03-23 LAB
25(OH)D3 SERPL-MCNC: 36.8 NG/ML
ALBUMIN SERPL ELPH-MCNC: 4.6 G/DL
ALP BLD-CCNC: 76 U/L
ALT SERPL-CCNC: 23 U/L
ANION GAP SERPL CALC-SCNC: 12 MMOL/L
AST SERPL-CCNC: 22 U/L
BASOPHILS # BLD AUTO: 0.05 K/UL
BASOPHILS NFR BLD AUTO: 1.3 %
BILIRUB SERPL-MCNC: 0.5 MG/DL
BUN SERPL-MCNC: 14 MG/DL
CALCIUM SERPL-MCNC: 10 MG/DL
CANCER AG125 SERPL-ACNC: 11 U/ML
CANCER AG27-29 SERPL-ACNC: 27.6 U/ML
CHLORIDE SERPL-SCNC: 104 MMOL/L
CO2 SERPL-SCNC: 25 MMOL/L
CREAT SERPL-MCNC: 0.66 MG/DL
EGFR: 95 ML/MIN/1.73M2
EOSINOPHIL # BLD AUTO: 0.11 K/UL
EOSINOPHIL NFR BLD AUTO: 2.9 %
ESTIMATED AVERAGE GLUCOSE: 111 MG/DL
GLUCOSE SERPL-MCNC: 90 MG/DL
HBA1C MFR BLD HPLC: 5.5 %
HCT VFR BLD CALC: 40.1 %
HGB BLD-MCNC: 13.3 G/DL
IMM GRANULOCYTES NFR BLD AUTO: 0 %
LYMPHOCYTES # BLD AUTO: 1.07 K/UL
LYMPHOCYTES NFR BLD AUTO: 28.5 %
MAN DIFF?: NORMAL
MCHC RBC-ENTMCNC: 33.2 GM/DL
MCHC RBC-ENTMCNC: 33.2 PG
MCV RBC AUTO: 100 FL
MONOCYTES # BLD AUTO: 0.29 K/UL
MONOCYTES NFR BLD AUTO: 7.7 %
NEUTROPHILS # BLD AUTO: 2.23 K/UL
NEUTROPHILS NFR BLD AUTO: 59.6 %
PLATELET # BLD AUTO: 249 K/UL
POTASSIUM SERPL-SCNC: 4.4 MMOL/L
PROT SERPL-MCNC: 6.4 G/DL
RBC # BLD: 4.01 M/UL
RBC # FLD: 13.8 %
SODIUM SERPL-SCNC: 141 MMOL/L
T4 SERPL-MCNC: 6.6 UG/DL
TSH SERPL-ACNC: 1.48 UIU/ML
WBC # FLD AUTO: 3.75 K/UL

## 2023-03-26 LAB
CHOLEST SERPL-MCNC: 241 MG/DL
HDLC SERPL-MCNC: 110 MG/DL
LDLC SERPL CALC-MCNC: 118 MG/DL
NONHDLC SERPL-MCNC: 131 MG/DL
TRIGL SERPL-MCNC: 67 MG/DL

## 2023-03-27 ENCOUNTER — TRANSCRIPTION ENCOUNTER (OUTPATIENT)
Age: 68
End: 2023-03-27

## 2023-03-30 ENCOUNTER — APPOINTMENT (OUTPATIENT)
Dept: INTERNAL MEDICINE | Facility: CLINIC | Age: 68
End: 2023-03-30

## 2023-06-28 ENCOUNTER — EMERGENCY (EMERGENCY)
Facility: HOSPITAL | Age: 68
LOS: 1 days | Discharge: HOME | End: 2023-06-28
Payer: COMMERCIAL

## 2023-06-28 VITALS
HEIGHT: 60.5 IN | SYSTOLIC BLOOD PRESSURE: 207 MMHG | OXYGEN SATURATION: 99 % | RESPIRATION RATE: 18 BRPM | HEART RATE: 82 BPM | DIASTOLIC BLOOD PRESSURE: 102 MMHG | TEMPERATURE: 97 F | WEIGHT: 95.02 LBS

## 2023-06-28 LAB
ALBUMIN SERPL ELPH-MCNC: 4.7 G/DL — SIGNIFICANT CHANGE UP (ref 3.3–5)
ALP SERPL-CCNC: 75 U/L — SIGNIFICANT CHANGE UP (ref 40–120)
ALT FLD-CCNC: 25 U/L — SIGNIFICANT CHANGE UP (ref 10–45)
ANION GAP SERPL CALC-SCNC: 15 MMOL/L — SIGNIFICANT CHANGE UP (ref 5–17)
AST SERPL-CCNC: 31 U/L — SIGNIFICANT CHANGE UP (ref 10–40)
BILIRUB SERPL-MCNC: 0.4 MG/DL — SIGNIFICANT CHANGE UP (ref 0.2–1.2)
BUN SERPL-MCNC: 14 MG/DL — SIGNIFICANT CHANGE UP (ref 7–23)
CALCIUM SERPL-MCNC: 9.5 MG/DL — SIGNIFICANT CHANGE UP (ref 8.4–10.5)
CHLORIDE SERPL-SCNC: 104 MMOL/L — SIGNIFICANT CHANGE UP (ref 96–108)
CO2 SERPL-SCNC: 24 MMOL/L — SIGNIFICANT CHANGE UP (ref 22–31)
CREAT SERPL-MCNC: 0.84 MG/DL — SIGNIFICANT CHANGE UP (ref 0.5–1.3)
EGFR: 76 ML/MIN/1.73M2 — SIGNIFICANT CHANGE UP
GLUCOSE SERPL-MCNC: 89 MG/DL — SIGNIFICANT CHANGE UP (ref 70–99)
HCT VFR BLD CALC: 41.2 % — SIGNIFICANT CHANGE UP (ref 34.5–45)
HGB BLD-MCNC: 13.6 G/DL — SIGNIFICANT CHANGE UP (ref 11.5–15.5)
MCHC RBC-ENTMCNC: 33 GM/DL — SIGNIFICANT CHANGE UP (ref 32–36)
MCHC RBC-ENTMCNC: 33.3 PG — SIGNIFICANT CHANGE UP (ref 27–34)
MCV RBC AUTO: 100.7 FL — HIGH (ref 80–100)
NRBC # BLD: 0 /100 WBCS — SIGNIFICANT CHANGE UP (ref 0–0)
PLATELET # BLD AUTO: 262 K/UL — SIGNIFICANT CHANGE UP (ref 150–400)
POTASSIUM SERPL-MCNC: 4.1 MMOL/L — SIGNIFICANT CHANGE UP (ref 3.5–5.3)
POTASSIUM SERPL-SCNC: 4.1 MMOL/L — SIGNIFICANT CHANGE UP (ref 3.5–5.3)
PROT SERPL-MCNC: 7 G/DL — SIGNIFICANT CHANGE UP (ref 6–8.3)
RBC # BLD: 4.09 M/UL — SIGNIFICANT CHANGE UP (ref 3.8–5.2)
RBC # FLD: 13.4 % — SIGNIFICANT CHANGE UP (ref 10.3–14.5)
SODIUM SERPL-SCNC: 143 MMOL/L — SIGNIFICANT CHANGE UP (ref 135–145)
WBC # BLD: 6.79 K/UL — SIGNIFICANT CHANGE UP (ref 3.8–10.5)
WBC # FLD AUTO: 6.79 K/UL — SIGNIFICANT CHANGE UP (ref 3.8–10.5)

## 2023-06-28 PROCEDURE — 85027 COMPLETE CBC AUTOMATED: CPT

## 2023-06-28 PROCEDURE — 73630 X-RAY EXAM OF FOOT: CPT | Mod: 26,LT

## 2023-06-28 PROCEDURE — 99284 EMERGENCY DEPT VISIT MOD MDM: CPT | Mod: 25

## 2023-06-28 PROCEDURE — 73630 X-RAY EXAM OF FOOT: CPT

## 2023-06-28 PROCEDURE — 36415 COLL VENOUS BLD VENIPUNCTURE: CPT

## 2023-06-28 PROCEDURE — 99284 EMERGENCY DEPT VISIT MOD MDM: CPT

## 2023-06-28 PROCEDURE — 80053 COMPREHEN METABOLIC PANEL: CPT

## 2023-06-28 RX ORDER — IBUPROFEN 200 MG
600 TABLET ORAL ONCE
Refills: 0 | Status: COMPLETED | OUTPATIENT
Start: 2023-06-28 | End: 2023-06-28

## 2023-06-28 RX ORDER — ACETAMINOPHEN 500 MG
975 TABLET ORAL ONCE
Refills: 0 | Status: COMPLETED | OUTPATIENT
Start: 2023-06-28 | End: 2023-06-28

## 2023-06-28 RX ADMIN — Medication 975 MILLIGRAM(S): at 22:18

## 2023-06-28 RX ADMIN — Medication 600 MILLIGRAM(S): at 23:57

## 2023-06-28 NOTE — ED PROVIDER NOTE - PATIENT PORTAL LINK FT
You can access the FollowMyHealth Patient Portal offered by [facility] by registering at the following website: [email or web address]. By joining [facility]’s FollowMyHealth portal, you will also be able to view your health information using other applications (apps) compatible with our system.

## 2023-06-28 NOTE — ED PROVIDER NOTE - ATTENDING CONTRIBUTION TO CARE
MD Akins:  patient seen and evaluated personally.   I agree with the History & Physical,  Impression & Plan other than what was detailed in my note.  MD Akins  67 year old female with pmh of nephrolithiasis and breast ca on remission presenting to ed w/ r sided foot pain, started earlier tonight, came on suddenly, is painful on dorsum and more so on bottom of foot just proximal to toes, slight erythema over dorsum 3x3 patch, slight erythema to plantar surface w/ ttp, no recent traumas, no long runs or standing on feet, no f/c n/v, no hx of afib/heart palpitations, no hx of gout, she has very brisk pulses in pt, dp that are equal b/l, no ttp over dorsum, she is able to dorsi and plantar flex, there is no evidence of septic arthritis w/ f range and no erythema over joint, no calf ttp to suggest dvt, considered arterial injury but brisk pulses, equal b/l feet equal temp, ttp over dorsum, considered dvt but does not seem clinically likely given no calf ttp/ no swelling, no rf, gout seems atypical w/ redness in two distributions, plantar fascitis? discussed can use apap for pain, discussed potentially early cellulitis? but atypical, discussed r vs b of abx w/ pt currently and this point would like to avoid them (no recent bug/tick bites no rash/erythema elswhere), I did feel this is reasonable as pt states she can see her podiatrist in the next day or two at the max, however I did discuss strict return precautions .

## 2023-06-28 NOTE — ED PROVIDER NOTE - RAPID ASSESSMENT
67 y F h/o kidney stones and breast ca (remission) now with several hours atraumatic L foot pain, insidious onset, severe, worse c range of motion and palpation.  Developing paresthesias in the last hour or so.  No ankle pain.  No h/o gout.  Had 1 glass of wine with dinner tonight, believes pain started after.    **Anaphylaxis with toradol**    Exam -- focal dorsal midfoot swelling with tenderness to palpation that is severe, as well as swelling to plantar aspect, pain with passive stretch.  Mild swelling to toes.  Compartments seem soft, CR < 2 sec to all toes, DP/PT symmetric.  Able to range toes but reproduces pain.      *** I, Frank Merchant MD., in the role of "Quick Triage Doctor (QDOC)," performed an initial, limited, rapid assessment of this patient, through either telemedicine or face to face encounter.  Based on history of present illness and general appearance, I determined that the patient should be evaluated in the main ED and initiated a preliminary workup to expedite the patient's course in the emergency department; this patient's evaluation is not complete and only preliminary.     The full assessment, management, and reassessment of this patient, including but not limited to the follow up of ordered laboratory and radiologic testing, and any/all other patient care impacting decisions is deferred to the receiving primary emergency department team.  I was not involved in this patient's care after the QDOC process, and unless otherwise noted in the ED provider note, was not involved in their care during their ED course     I saw the patient waiting area; a brief history was taken and a thorough physical exam was not performed as there is no physical exam room available.  The patient will be seen and further worked up in the main emergency department and their care will be completed by the main emergency department team.  I was not involved in this patient's care after the QDOC process, and unless otherwise noted in the ED provider note, was not involved in their care during their ED course.      The scribe's documentation, where applicable, has been prepared under my direction and personally reviewed by me in its entirety. I confirm that the note above accurately reflects all work, treatment, procedures, and medical decision making performed by me (Dr. Merchant). ***

## 2023-06-28 NOTE — ED PROVIDER NOTE - PHYSICAL EXAMINATION
General: NAD  HEENT: NCAT.   Cardiac: RRR, 2+ radial pulses  Chest: normal respiration.   Extremities: mild erythema on the L mid foot. Tenderness to palpation to slight touch. ROM restricted due to pain. No significant swelling.   Skin: no rashes  Neuro: AAOx3, motor and sensory grossly intact.  Psych: mood and affect appropriate

## 2023-06-28 NOTE — ED PROVIDER NOTE - NSFOLLOWUPCLINICS_GEN_ALL_ED_FT
[facility]  Podiatry  [address] [place] [address] - [address] [place] [address]  Phone: [phone]  Fax:   Follow Up Time: Urgent

## 2023-06-28 NOTE — ED PROVIDER NOTE - OBJECTIVE STATEMENT
This is a 67 year old female with pmh of nephrolithiasis and breast ca on remission presenting with several hours of atraumatic L foot pain s/p dinner. Started suddenly to maximal pain in mid foot without trauma This is a 67 year old female with pmh of nephrolithiasis and breast ca on remission presenting with several hours of atraumatic L foot pain s/p dinner. Started suddenly to maximal pain in mid foot without trauma. Hasn't taken any medication for it. Was brought in by her . Reports allergies to Toradol and describes her face feeling like blowing up with this. Denies any fever/chills. Reports swelling was worse when it started but it got better now. The pain stayed the same.

## 2023-06-28 NOTE — ED PROVIDER NOTE - NSFOLLOWUPINSTRUCTIONS_ED_ALL_ED_FT
No signs of emergency medical condition on today's workup.  Presumptive diagnosis made, but further evaluation may be required by your primary care doctor or specialist for a definitive diagnosis.  Therefore, follow up as directed and if symptoms change/worsen or any emergency conditions, please return to the ER.    YOU WERE SEEN FOR left foot pain    YOU HAD labs. xray done.   You can take ibuprofen 400mg every 6 to 8 hours and Tylenol 1000mg every 6 to 8 hours as needed for pain.     FOLLOW UP WITH YOUR PRIMARY CARE PROVIDER    RETURN TO THE EMERGENCY DEPARTMENT FOR worsening pain, redness, swelling, fever, or any new/concerning symptoms. No signs of emergency medical condition on today's workup.  Presumptive diagnosis made, but further evaluation may be required by your primary care doctor or specialist for a definitive diagnosis.  Therefore, follow up as directed and if symptoms change/worsen or any emergency conditions, please return to the ER.    YOU WERE SEEN FOR left foot pain    YOU HAD labs. xray done.   You can take ibuprofen 400mg every 6 to 8 hours and Tylenol 1000mg every 6 to 8 hours as needed for pain.   We have placed a urgent referral to podiatry. A representative will call you to make an appointment.     FOLLOW UP WITH YOUR PRIMARY CARE PROVIDER    RETURN TO THE EMERGENCY DEPARTMENT FOR worsening pain, redness, swelling, fever, or any new/concerning symptoms.

## 2023-06-28 NOTE — ED PROVIDER NOTE - CLINICAL SUMMARY MEDICAL DECISION MAKING FREE TEXT BOX
This is a 67 year old female with pmh of nephrolithiasis and breast ca on remission presenting with several hours of atraumatic L foot pain s/p dinner. Afebrile. Physical exam shows + ttp however unremarkable swelling and erythema. Xray negative for fracture or obvious mass. Will treat with ibuprofen 600mg, reassess. Most likely dc.

## 2023-06-28 NOTE — ED ADULT TRIAGE NOTE - WEIGHT METHOD
Blood pressure is stable actually has a more of a problem of ischemic cardiomyopathy with congestive heart failure and is medication and doing well is taking carvedilol Plavix aspirin omeprazole and Crestor   stated

## 2023-06-28 NOTE — ED PROVIDER NOTE - PROGRESS NOTE DETAILS
Fareed Ross, PGY1 - talked to patient about differentials. Symptoms very nonspecific. Physical exam unremarkable at this time however significant pain and restricted ROM. Low suspicion however cannot rule out infection, gout, vasculitis. Offered Podiatry consult, however patient declined. Offered evaluation for pocket for fluid removal to r/o septic arthritis. Patient declined. Indicated that she would like to f/u outpatient with podiatry f/u. Offered urgent referral. Patient amenable to this option. Will dc with urgent podiatry referral. Gave crutches.

## 2023-06-29 ENCOUNTER — APPOINTMENT (OUTPATIENT)
Dept: ORTHOPEDIC SURGERY | Facility: CLINIC | Age: 68
End: 2023-06-29
Payer: COMMERCIAL

## 2023-06-29 VITALS
SYSTOLIC BLOOD PRESSURE: 182 MMHG | BODY MASS INDEX: 18.17 KG/M2 | WEIGHT: 95 LBS | HEART RATE: 62 BPM | DIASTOLIC BLOOD PRESSURE: 101 MMHG | HEIGHT: 60.5 IN | OXYGEN SATURATION: 98 %

## 2023-06-29 VITALS
SYSTOLIC BLOOD PRESSURE: 169 MMHG | RESPIRATION RATE: 18 BRPM | TEMPERATURE: 98 F | DIASTOLIC BLOOD PRESSURE: 90 MMHG | OXYGEN SATURATION: 98 % | HEART RATE: 74 BPM

## 2023-06-29 DIAGNOSIS — M79.672 PAIN IN LEFT FOOT: ICD-10-CM

## 2023-06-29 PROCEDURE — 99203 OFFICE O/P NEW LOW 30 MIN: CPT

## 2023-06-29 NOTE — PHYSICAL EXAM
[Slightly Antalgic] : slightly antalgic [LE] : Sensory: Intact in bilateral lower extremities [DP] : dorsalis pedis 2+ and symmetric bilaterally [PT] : posterior tibial 2+ and symmetric bilaterally [Normal] : Alert and in no acute distress [Poor Appearance] : well-appearing [Acute Distress] : not in acute distress [Obese] : not obese [de-identified] : The patient has no respiratory distress. Mood and affect are normal. The patient is alert and oriented to person, place and time.\par There is no pain with motion of the knees.  There is no tenderness of either knee.  Both calves are soft and nontender.  Both Achilles tendons are intact.  The right foot is nontender and nonswollen.  The left foot has minimal dorsal tenderness and minimal tenderness at the first MTP joint.  There is minimal erythema at the first MTP joint.  There is no pain with passive motion of the toes.  There is no ankle instability. [de-identified] : X-rays of the left foot taken in the emergency room and [facility] are reviewed.  There are no fractures or dislocations.

## 2023-06-29 NOTE — ED ADULT NURSE NOTE - OBJECTIVE STATEMENT
PT is a 66 yo f coming from home c/o left foot pain x 1 day. PT states that she was out to dinner, denies injury to area, started feeling cramping in her right foot which then resolved. PT states that she then had cramping in her left foot, which has gotten worse and she is now not able to ambulate. PT states 9/10 pain currently and unable to bear weight onto extremity, positive pedal pulses. PMH of kidney stones, bowel prolapse, breast cancer, HTN. PT A,Ox4, ambulatory at baseline. Respirations even and unlabored, abd soft, nondistended and nontender, skin warm, dry and intact, unable to move left foot. Denies HA, CP, SOB, n/v/d, fever, chills and urinary symptoms. Stretcher locked in lowest position, appropriate side rails up for safety, pt instructed to call for RN if anything needed.

## 2023-06-29 NOTE — DISCUSSION/SUMMARY
[de-identified] : The patient had acute onset of severe left foot pain which has largely resolved.  She has taken 200 mg of ibuprofen twice today.  I am not finding any evidence of infection.  Her exam is not classic for gouty arthritis.  I recommend another 2 days of ibuprofen for symptoms.  She will take ibuprofen with Pepcid.  She may bear weight as tolerated.  If symptoms progress she will be reevaluated.

## 2023-06-29 NOTE — HISTORY OF PRESENT ILLNESS
[de-identified] : 67 year old female presents with left foot pain. She developed "cramps" at the midfoot of both feet last night while on her way to dinner. The pain in her left foot worsened and she was unable to walk by the end of her meal. She was evaluated in the ED at [facility] where xrays were negative for fracture. She has been taking Advil 200 mg and reports that the pain is gradually improving however she is still unable to comfortably bear weight. She describes diffuse pain over the dorsum of the midfoot. She denies recent injury or trauma.

## 2023-07-09 NOTE — PHYSICAL EXAM
[No Acute Distress] : no acute distress [Well Nourished] : well nourished [Normal Voice/Communication] : normal voice/communication [Well Developed] : well developed [Well-Appearing] : well-appearing [PERRL] : pupils equal round and reactive to light [Normal Sclera/Conjunctiva] : normal sclera/conjunctiva [EOMI] : extraocular movements intact [Normal Oropharynx] : the oropharynx was normal [Normal TMs] : both tympanic membranes were normal [Normal Outer Ear/Nose] : the outer ears and nose were normal in appearance [Supple] : supple [No Lymphadenopathy] : no lymphadenopathy [No JVD] : no jugular venous distention [No Respiratory Distress] : no respiratory distress  [Thyroid Normal, No Nodules] : the thyroid was normal and there were no nodules present [No Accessory Muscle Use] : no accessory muscle use [Clear to Auscultation] : lungs were clear to auscultation bilaterally [Normal Percussion] : the chest was normal to percussion [Normal Rate] : normal rate  [Regular Rhythm] : with a regular rhythm [Normal S1, S2] : normal S1 and S2 [No Murmur] : no murmur heard [Pedal Pulses Present] : the pedal pulses are present [No Abdominal Bruit] : a ~M bruit was not heard ~T in the abdomen [No Carotid Bruits] : no carotid bruits [No Palpable Aorta] : no palpable aorta [No Edema] : there was no peripheral edema [No Axillary Lymphadenopathy] : no axillary lymphadenopathy [Soft] : abdomen soft [Non Tender] : non-tender [Non-distended] : non-distended [No Masses] : no abdominal mass palpated [Normal Supraclavicular Nodes] : no supraclavicular lymphadenopathy [Normal Bowel Sounds] : normal bowel sounds 1.2 [No HSM] : no HSM [Normal Posterior Cervical Nodes] : no posterior cervical lymphadenopathy [Normal Axillary Nodes] : no axillary lymphadenopathy [Normal Anterior Cervical Nodes] : no anterior cervical lymphadenopathy [Normal Inguinal Nodes] : no inguinal lymphadenopathy [No CVA Tenderness] : no CVA  tenderness [No Spinal Tenderness] : no spinal tenderness [No Joint Swelling] : no joint swelling [Grossly Normal Strength/Tone] : grossly normal strength/tone [No Rash] : no rash [No Skin Lesions] : no skin lesions [Normal Gait] : normal gait [No Focal Deficits] : no focal deficits [Coordination Grossly Intact] : coordination grossly intact [Normal Affect] : the affect was normal [Deep Tendon Reflexes (DTR)] : deep tendon reflexes were 2+ and symmetric [Speech Grossly Normal] : speech grossly normal [Alert and Oriented x3] : oriented to person, place, and time [Normal Mood] : the mood was normal [Normal Insight/Judgement] : insight and judgment were intact [No Varicosities] : no varicosities [No Extremity Clubbing/Cyanosis] : no extremity clubbing/cyanosis [Memory Grossly Normal] : memory grossly normal [de-identified] : Status post bilateral mastectomy with breast implants

## 2023-11-22 ENCOUNTER — NON-APPOINTMENT (OUTPATIENT)
Age: 68
End: 2023-11-22

## 2023-12-05 ENCOUNTER — OUTPATIENT (OUTPATIENT)
Dept: OUTPATIENT SERVICES | Facility: HOSPITAL | Age: 68
LOS: 1 days | Discharge: HOME | End: 2023-12-05

## 2023-12-05 DIAGNOSIS — C50.919 MALIGNANT NEOPLASM OF UNSPECIFIED SITE OF UNSPECIFIED FEMALE BREAST: ICD-10-CM

## 2024-01-04 ENCOUNTER — NON-APPOINTMENT (OUTPATIENT)
Age: 69
End: 2024-01-04

## 2024-02-12 ENCOUNTER — TRANSCRIPTION ENCOUNTER (OUTPATIENT)
Age: 69
End: 2024-02-12

## 2024-02-22 ENCOUNTER — OUTPATIENT (OUTPATIENT)
Dept: OUTPATIENT SERVICES | Facility: HOSPITAL | Age: 69
LOS: 1 days | Discharge: HOME | End: 2024-02-22

## 2024-02-22 DIAGNOSIS — C50.919 MALIGNANT NEOPLASM OF UNSPECIFIED SITE OF UNSPECIFIED FEMALE BREAST: ICD-10-CM

## 2024-03-05 DIAGNOSIS — Z85.3 ENCOUNTER FOR FOLLOW-UP EXAMINATION AFTER COMPLETED TREATMENT FOR MALIGNANT NEOPLASM: ICD-10-CM

## 2024-03-05 DIAGNOSIS — Z08 ENCOUNTER FOR FOLLOW-UP EXAMINATION AFTER COMPLETED TREATMENT FOR MALIGNANT NEOPLASM: ICD-10-CM

## 2024-03-06 ENCOUNTER — NON-APPOINTMENT (OUTPATIENT)
Age: 69
End: 2024-03-06

## 2024-03-20 ENCOUNTER — APPOINTMENT (OUTPATIENT)
Dept: HEMATOLOGY ONCOLOGY | Facility: CLINIC | Age: 69
End: 2024-03-20
Payer: COMMERCIAL

## 2024-03-20 ENCOUNTER — APPOINTMENT (OUTPATIENT)
Dept: HEMATOLOGY ONCOLOGY | Facility: CLINIC | Age: 69
End: 2024-03-20

## 2024-03-20 VITALS
OXYGEN SATURATION: 96 % | BODY MASS INDEX: 18.25 KG/M2 | SYSTOLIC BLOOD PRESSURE: 175 MMHG | TEMPERATURE: 97.3 F | WEIGHT: 95 LBS | RESPIRATION RATE: 15 BRPM | HEART RATE: 66 BPM | DIASTOLIC BLOOD PRESSURE: 93 MMHG

## 2024-03-20 DIAGNOSIS — C50.919 MALIGNANT NEOPLASM OF UNSPECIFIED SITE OF UNSPECIFIED FEMALE BREAST: ICD-10-CM

## 2024-03-20 PROCEDURE — 99213 OFFICE O/P EST LOW 20 MIN: CPT

## 2024-03-22 PROBLEM — C50.919 BREAST CANCER: Status: ACTIVE | Noted: 2017-03-23

## 2024-03-22 NOTE — REVIEW OF SYSTEMS
[Joint Pain] : joint pain [Diarrhea: Grade 0] : Diarrhea: Grade 0 [Joint Stiffness] : joint stiffness [Negative] : Allergic/Immunologic [FreeTextEntry4] : Patient had recent upper respiratory infection which has resolved.  Patient has history of allergies. [FreeTextEntry5] : Patient has episode of shortness of breath 2 weeks ago and saw her primary care physician and echocardiogram ordered.  Patient has tightness in the chest and has been ordered and inhaler as she has previous history of asthma. [FreeTextEntry6] : Patient has history of asthma and has tightness in the chest and has been prescribed inhaler. [FreeTextEntry8] : Patient has a history of renal stones. [FreeTextEntry7] : Patient has occasional heartburn and constipation and takes MiraLAX daily.

## 2024-03-22 NOTE — ASSESSMENT
[Curative] : Goals of care discussed with patient: Curative [FreeTextEntry1] :   The patient will continue surveillance for her history of breast carcinoma diagnosed in 2002 and has had no evidence of recurrent disease.  She will continue medical, GI, GYN and surgical follow-up and testing as indicated.  She has had revision of her breast plastic surgery and now feels more comfortable with the implants.  She is status post bilateral mastectomies.  She will return to the office in 1 year or sooner as needed.

## 2024-03-22 NOTE — HISTORY OF PRESENT ILLNESS
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 [de-identified] : This is a 60-year-old woman with history of breast carcinoma. The patient has undergone bilateral mastectomies in 2002  for early stage breast carcinoma T1 B. and also received chemotherapy with CMF treatment. She decided not to take hormonal treatment even though her tumors were ER positive. She continues to do well and has had no evidence of recurrence of her disease. [de-identified] : Since the last visit the patient had an episode of bowel prolapse after falling on ice.  This has been corrected and now she is doing well.  She returns for routine follow-up.

## 2024-03-22 NOTE — PHYSICAL EXAM
[Fully active, able to carry on all pre-disease performance without restriction] : Status 0 - Fully active, able to carry on all pre-disease performance without restriction [de-identified] : Patient has had revision of the bilateral mastectomies with plastic surgery. [Normal] : affect appropriate

## 2024-03-26 ENCOUNTER — APPOINTMENT (OUTPATIENT)
Dept: GASTROENTEROLOGY | Facility: CLINIC | Age: 69
End: 2024-03-26
Payer: COMMERCIAL

## 2024-03-26 VITALS
DIASTOLIC BLOOD PRESSURE: 85 MMHG | SYSTOLIC BLOOD PRESSURE: 145 MMHG | HEART RATE: 70 BPM | OXYGEN SATURATION: 96 % | HEIGHT: 60.5 IN | WEIGHT: 95 LBS | BODY MASS INDEX: 18.17 KG/M2

## 2024-03-26 PROCEDURE — 99213 OFFICE O/P EST LOW 20 MIN: CPT

## 2024-03-26 NOTE — ASSESSMENT
[FreeTextEntry1] : This is a pleasant 68-year-old female with a history of breast cancer, s/p b/l mastectomy and chemo 22 years ago, GERD, constipation who presents today for follow up visit. Patient reports that in January 2023 she was admitted at [facility] for bowel prolapse and rectal pain, s/p fall that morning. She reports that bowel was manually repositioned. Since rectal prolapse she has been taking MiraLAX once daily. She reports that she has at least one bowel movement daily, however at times she may go 1-2 days without having a bowel movement. She denies any diarrhea. She reports bloating and abdominal distension.  Patient had an EGD in February 2022 which showed benign gastric polyp but was otherwise normal. She also underwent a colonoscopy at the same time which showed sessile serrated adenoma. She will be due for a surveillance colonoscopy in February 2025.  Discussed with patient that she should continue with MiraLAX daily to prevent constipation and straining which can increase her risk of bowel prolapse. For her gas and bloating, I recommend a lactulose breath test to r/o SIBO. I recommend a 2-week trial of a FODMAP diet. Patient states that she will not be able to do the lactulose breath test because the test requires fasting, and it is time consuming. I recommend a trial of FDgard. She states that she will try the FODMAP diet. All questions answered. She states understanding. She is to call me back with any questions or concerns.

## 2024-03-26 NOTE — PHYSICAL EXAM
[Alert] : alert [Normal Voice/Communication] : normal voice/communication [Healthy Appearing] : healthy appearing [No Acute Distress] : no acute distress [No Respiratory Distress] : no respiratory distress [No Acc Muscle Use] : no accessory muscle use [Respiration, Rhythm And Depth] : normal respiratory rhythm and effort [Auscultation Breath Sounds / Voice Sounds] : lungs were clear to auscultation bilaterally [Heart Rate And Rhythm] : heart rate was normal and rhythm regular [Normal S1, S2] : normal S1 and S2 [Murmurs] : no murmurs [Bowel Sounds] : normal bowel sounds [No Masses] : no abdominal mass palpated [Abdomen Soft] : soft [] : no hepatosplenomegaly [Oriented To Time, Place, And Person] : oriented to person, place, and time [Abdomen Tenderness] : non-tender

## 2024-03-26 NOTE — HISTORY OF PRESENT ILLNESS
[FreeTextEntry1] : This is a pleasant 68-year-old female with a history of breast cancer, s/p b/l mastectomy and chemo 22 years ago, GERD, constipation who presents today for follow up visit. Patient reports that in January 2023 she was admitted at [facility] for bowel prolapse and rectal pain, s/p fall that morning. She reports that bowel was manually repositioned. Since bowel prolapse, she has been taking MiraLAX once daily. She reports that she has at least one bowel movement daily, however at times she may go 1-2 days without having a bowel movement. She denies any diarrhea. She reports bloating and abdominal distension. Patient had an EGD in February 2022 which showed benign gastric polyp but was otherwise normal. She also underwent a colonoscopy at the same time which showed sessile serrated adenoma.

## 2024-05-09 ENCOUNTER — APPOINTMENT (OUTPATIENT)
Dept: RADIOLOGY | Facility: IMAGING CENTER | Age: 69
End: 2024-05-09

## 2024-07-16 ENCOUNTER — APPOINTMENT (OUTPATIENT)
Dept: RADIOLOGY | Facility: IMAGING CENTER | Age: 69
End: 2024-07-16
Payer: COMMERCIAL

## 2024-07-16 ENCOUNTER — OUTPATIENT (OUTPATIENT)
Dept: OUTPATIENT SERVICES | Facility: HOSPITAL | Age: 69
LOS: 1 days | End: 2024-07-16
Payer: COMMERCIAL

## 2024-07-16 DIAGNOSIS — Z00.8 ENCOUNTER FOR OTHER GENERAL EXAMINATION: ICD-10-CM

## 2024-07-16 PROCEDURE — 77080 DXA BONE DENSITY AXIAL: CPT

## 2024-07-16 PROCEDURE — 77080 DXA BONE DENSITY AXIAL: CPT | Mod: 26

## 2024-11-19 ENCOUNTER — NON-APPOINTMENT (OUTPATIENT)
Age: 69
End: 2024-11-19

## 2025-02-06 ENCOUNTER — APPOINTMENT (OUTPATIENT)
Dept: OTOLARYNGOLOGY | Facility: CLINIC | Age: 70
End: 2025-02-06
Payer: COMMERCIAL

## 2025-02-06 VITALS
HEART RATE: 64 BPM | HEIGHT: 60.5 IN | SYSTOLIC BLOOD PRESSURE: 162 MMHG | TEMPERATURE: 97.6 F | DIASTOLIC BLOOD PRESSURE: 84 MMHG | WEIGHT: 95 LBS | BODY MASS INDEX: 18.17 KG/M2

## 2025-02-06 DIAGNOSIS — H61.23 IMPACTED CERUMEN, BILATERAL: ICD-10-CM

## 2025-02-06 DIAGNOSIS — H93.291 OTHER ABNORMAL AUDITORY PERCEPTIONS, RIGHT EAR: ICD-10-CM

## 2025-02-06 DIAGNOSIS — H93.8X1 OTHER SPECIFIED DISORDERS OF RIGHT EAR: ICD-10-CM

## 2025-02-06 DIAGNOSIS — J34.2 DEVIATED NASAL SEPTUM: ICD-10-CM

## 2025-02-06 DIAGNOSIS — H69.91 UNSPECIFIED EUSTACHIAN TUBE DISORDER, RIGHT EAR: ICD-10-CM

## 2025-02-06 PROCEDURE — 99203 OFFICE O/P NEW LOW 30 MIN: CPT | Mod: 25

## 2025-02-06 PROCEDURE — 92557 COMPREHENSIVE HEARING TEST: CPT

## 2025-02-06 PROCEDURE — 92567 TYMPANOMETRY: CPT

## 2025-02-06 PROCEDURE — G0268 REMOVAL OF IMPACTED WAX MD: CPT

## 2025-02-06 PROCEDURE — 31231 NASAL ENDOSCOPY DX: CPT

## 2025-02-28 ENCOUNTER — OUTPATIENT (OUTPATIENT)
Dept: OUTPATIENT SERVICES | Facility: HOSPITAL | Age: 70
LOS: 1 days | Discharge: HOME | End: 2025-02-28

## 2025-03-03 ENCOUNTER — APPOINTMENT (OUTPATIENT)
Dept: HEMATOLOGY ONCOLOGY | Facility: CLINIC | Age: 70
End: 2025-03-03
Payer: COMMERCIAL

## 2025-03-03 VITALS
OXYGEN SATURATION: 98 % | WEIGHT: 95 LBS | SYSTOLIC BLOOD PRESSURE: 178 MMHG | BODY MASS INDEX: 18.17 KG/M2 | DIASTOLIC BLOOD PRESSURE: 92 MMHG | HEART RATE: 75 BPM | HEIGHT: 60.5 IN | RESPIRATION RATE: 16 BRPM | TEMPERATURE: 97.4 F

## 2025-03-03 DIAGNOSIS — C50.919 MALIGNANT NEOPLASM OF UNSPECIFIED SITE OF UNSPECIFIED FEMALE BREAST: ICD-10-CM

## 2025-03-03 DIAGNOSIS — K59.09 OTHER CONSTIPATION: ICD-10-CM

## 2025-03-03 PROCEDURE — 99213 OFFICE O/P EST LOW 20 MIN: CPT

## 2025-03-18 DIAGNOSIS — D12.6 BENIGN NEOPLASM OF COLON, UNSPECIFIED: ICD-10-CM

## 2025-03-18 RX ORDER — SODIUM SULFATE, POTASSIUM SULFATE AND MAGNESIUM SULFATE 1.6; 3.13; 17.5 G/177ML; G/177ML; G/177ML
17.5-3.13-1.6 SOLUTION ORAL
Qty: 1 | Refills: 0 | Status: ACTIVE | COMMUNITY
Start: 2025-03-18 | End: 1900-01-01

## 2025-05-14 ENCOUNTER — TRANSCRIPTION ENCOUNTER (OUTPATIENT)
Age: 70
End: 2025-05-14

## 2025-05-19 ENCOUNTER — TRANSCRIPTION ENCOUNTER (OUTPATIENT)
Age: 70
End: 2025-05-19

## 2025-05-20 ENCOUNTER — APPOINTMENT (OUTPATIENT)
Dept: GASTROENTEROLOGY | Facility: CLINIC | Age: 70
End: 2025-05-20

## 2025-05-22 ENCOUNTER — APPOINTMENT (OUTPATIENT)
Dept: GASTROENTEROLOGY | Facility: CLINIC | Age: 70
End: 2025-05-22

## 2025-05-22 VITALS
WEIGHT: 96 LBS | DIASTOLIC BLOOD PRESSURE: 75 MMHG | SYSTOLIC BLOOD PRESSURE: 105 MMHG | OXYGEN SATURATION: 99 % | BODY MASS INDEX: 18.36 KG/M2 | HEIGHT: 60.5 IN | HEART RATE: 67 BPM

## 2025-05-22 DIAGNOSIS — D12.6 BENIGN NEOPLASM OF COLON, UNSPECIFIED: ICD-10-CM

## 2025-05-22 DIAGNOSIS — R14.0 ABDOMINAL DISTENSION (GASEOUS): ICD-10-CM

## 2025-05-22 DIAGNOSIS — K62.3 RECTAL PROLAPSE: ICD-10-CM

## 2025-05-22 PROCEDURE — G2211 COMPLEX E/M VISIT ADD ON: CPT | Mod: NC

## 2025-05-22 PROCEDURE — 99214 OFFICE O/P EST MOD 30 MIN: CPT

## 2025-05-31 PROBLEM — R14.0 ABDOMINAL BLOATING: Status: ACTIVE | Noted: 2025-05-31

## 2025-05-31 PROBLEM — K62.3 RECTAL PROLAPSE: Status: ACTIVE | Noted: 2025-05-31

## 2025-06-10 ENCOUNTER — TRANSCRIPTION ENCOUNTER (OUTPATIENT)
Age: 70
End: 2025-06-10

## 2025-06-11 ENCOUNTER — TRANSCRIPTION ENCOUNTER (OUTPATIENT)
Age: 70
End: 2025-06-11

## 2025-06-20 ENCOUNTER — RESULT REVIEW (OUTPATIENT)
Age: 70
End: 2025-06-20

## 2025-06-20 ENCOUNTER — TRANSCRIPTION ENCOUNTER (OUTPATIENT)
Age: 70
End: 2025-06-20

## 2025-06-20 ENCOUNTER — APPOINTMENT (OUTPATIENT)
Dept: GASTROENTEROLOGY | Facility: HOSPITAL | Age: 70
End: 2025-06-20

## 2025-06-20 ENCOUNTER — OUTPATIENT (OUTPATIENT)
Dept: OUTPATIENT SERVICES | Facility: HOSPITAL | Age: 70
LOS: 1 days | End: 2025-06-20
Payer: COMMERCIAL

## 2025-06-20 VITALS
DIASTOLIC BLOOD PRESSURE: 77 MMHG | SYSTOLIC BLOOD PRESSURE: 141 MMHG | OXYGEN SATURATION: 100 % | HEART RATE: 54 BPM | RESPIRATION RATE: 12 BRPM

## 2025-06-20 VITALS
SYSTOLIC BLOOD PRESSURE: 203 MMHG | HEART RATE: 62 BPM | TEMPERATURE: 97 F | OXYGEN SATURATION: 100 % | DIASTOLIC BLOOD PRESSURE: 92 MMHG | HEIGHT: 60 IN | WEIGHT: 95.02 LBS | RESPIRATION RATE: 16 BRPM

## 2025-06-20 DIAGNOSIS — D12.6 BENIGN NEOPLASM OF COLON, UNSPECIFIED: ICD-10-CM

## 2025-06-20 PROCEDURE — 88305 TISSUE EXAM BY PATHOLOGIST: CPT | Mod: 26

## 2025-06-20 PROCEDURE — G0105: CPT

## 2025-06-20 PROCEDURE — 45378 DIAGNOSTIC COLONOSCOPY: CPT | Mod: PT

## 2025-06-20 PROCEDURE — 88305 TISSUE EXAM BY PATHOLOGIST: CPT

## 2025-06-20 PROCEDURE — 43239 EGD BIOPSY SINGLE/MULTIPLE: CPT

## 2025-06-20 PROCEDURE — 43239 EGD BIOPSY SINGLE/MULTIPLE: CPT | Mod: 59

## 2025-06-20 DEVICE — NET RETRV ROT ROTH 2.5MMX230CM: Type: IMPLANTABLE DEVICE | Status: FUNCTIONAL

## 2025-06-20 RX ADMIN — Medication 30 MILLILITER(S): at 09:30

## 2025-06-20 NOTE — ASU PREOP CHECKLIST - WEIGHT IN LBS
95 Griseofulvin Pregnancy And Lactation Text: This medication is Pregnancy Category X and is known to cause serious birth defects. It is unknown if this medication is excreted in breast milk but breast feeding should be avoided.

## 2025-06-20 NOTE — ASU DISCHARGE PLAN (ADULT/PEDIATRIC) - FINANCIAL ASSISTANCE
[facility] provides services at a reduced cost to those who are determined to be eligible through [facility]’s financial assistance program. Information regarding [facility]’s financial assistance program can be found by going to [email or web address] or by calling [phone].

## 2025-06-20 NOTE — PRE PROCEDURE NOTE - PRE PROCEDURE EVALUATION
Attending Physician:   Iggy                         Procedure:  EGD/colonoscopy    Indication for Procedure:  abdominal bloating, surveillance colonoscopy  ________________________________________________________  PAST MEDICAL & SURGICAL HISTORY:  Vertigo      Breast Cancer      History of Bilateral Mastectomy  s/p reconstructive surgery        ALLERGIES:  penicillins (Urticaria; Rash)  Toradol (Angioedema)  Monurol (Other)    HOME MEDICATIONS:  ibersartin 75mg:     AICD/PPM: [ ] yes   [ x] no    PERTINENT LAB DATA:                      PHYSICAL EXAMINATION:    Height (cm): 152.4  Weight (kg): 43.1  BMI (kg/m2): 18.6  BSA (m2): 1.36T(C): 36.2  HR: 62  BP: 203/92  (white coat hypertension)  RR: 16  SpO2: 100%    Constitutional: NAD  HEENT:  anicteric  Neck:  No JVD  Respiratory: CTAB/L  Cardiovascular: S1 and S2  Gastrointestinal: BS+, soft, NT/ND  Extremities: No peripheral edema  Neurological: No confusion  Psychiatric: Normal mood, normal affect  Skin: No jaundice    ASA Class: I [ ]  II [x ]  III [ ]  IV [ ]    COMMENTS:    The patient is a suitable candidate for the planned procedure unless box checked [ ]  No, explain:

## 2025-06-23 ENCOUNTER — NON-APPOINTMENT (OUTPATIENT)
Age: 70
End: 2025-06-23

## 2025-06-24 ENCOUNTER — TRANSCRIPTION ENCOUNTER (OUTPATIENT)
Age: 70
End: 2025-06-24

## 2025-06-25 LAB — SURGICAL PATHOLOGY STUDY: SIGNIFICANT CHANGE UP

## 2025-07-31 ENCOUNTER — APPOINTMENT (OUTPATIENT)
Dept: GASTROENTEROLOGY | Facility: CLINIC | Age: 70
End: 2025-07-31
Payer: COMMERCIAL

## 2025-07-31 VITALS
BODY MASS INDEX: 18.17 KG/M2 | HEIGHT: 60.5 IN | WEIGHT: 95 LBS | HEART RATE: 68 BPM | SYSTOLIC BLOOD PRESSURE: 105 MMHG | OXYGEN SATURATION: 99 % | DIASTOLIC BLOOD PRESSURE: 75 MMHG

## 2025-07-31 DIAGNOSIS — D12.6 BENIGN NEOPLASM OF COLON, UNSPECIFIED: ICD-10-CM

## 2025-07-31 DIAGNOSIS — R14.0 ABDOMINAL DISTENSION (GASEOUS): ICD-10-CM

## 2025-07-31 PROCEDURE — G2211 COMPLEX E/M VISIT ADD ON: CPT | Mod: NC

## 2025-07-31 PROCEDURE — 99213 OFFICE O/P EST LOW 20 MIN: CPT

## 2025-08-01 ENCOUNTER — TRANSCRIPTION ENCOUNTER (OUTPATIENT)
Age: 70
End: 2025-08-01

## 2025-08-28 ENCOUNTER — APPOINTMENT (OUTPATIENT)
Dept: GASTROENTEROLOGY | Facility: CLINIC | Age: 70
End: 2025-08-28

## (undated) DEVICE — LINE BREATHE SAMPLNG

## (undated) DEVICE — UNDERPAD LINEN SAVER 17 X 24"

## (undated) DEVICE — SENSOR O2 FINGER ADULT

## (undated) DEVICE — DENTURE CUP PINK

## (undated) DEVICE — POLY TRAP ETRAP

## (undated) DEVICE — ELCTR GROUNDING PAD ADULT COVIDIEN

## (undated) DEVICE — GOWN LG

## (undated) DEVICE — TUBING SUCTION 20FT

## (undated) DEVICE — CATH IV SAFE BC 22G X 1" (BLUE)

## (undated) DEVICE — Device

## (undated) DEVICE — SYR LUER LOK 50CC

## (undated) DEVICE — IRRIGATOR BIO SHIELD

## (undated) DEVICE — TUBING SUCTION CONN 6FT STERILE

## (undated) DEVICE — TUBING MEDI-VAC W MAXIGRIP CONNECTORS 1/4"X6'

## (undated) DEVICE — LUBRICATING JELLY HR ONE SHOT 3G

## (undated) DEVICE — ESU ERBE 044850: Type: DURABLE MEDICAL EQUIPMENT

## (undated) DEVICE — SUCTION YANKAUER NO CONTROL VENT

## (undated) DEVICE — PACK IV START WITH CHG

## (undated) DEVICE — TUBING IV SET GRAVITY 3Y 100" MACRO

## (undated) DEVICE — BIOPSY FORCEP COLD DISP

## (undated) DEVICE — TUBING SUCTION NONCONDUCTIVE 6MM X 12FT

## (undated) DEVICE — FACESHIELD FULL VISOR

## (undated) DEVICE — FORCEP RADIAL JAW 4 JUMBO 2.8MM 3.2MM 240CM ORANGE DISP

## (undated) DEVICE — DRSG 2X2

## (undated) DEVICE — SALIVA EJECTOR (BLUE)

## (undated) DEVICE — CONTAINER FORMALIN 80ML YELLOW

## (undated) DEVICE — CONTAINER FORMALIN 10% 20ML

## (undated) DEVICE — BIOPSY FORCEP RADIAL JAW 4 STANDARD WITH NEEDLE

## (undated) DEVICE — CATH IV SAFE BC 20G X 1.16" (PINK)

## (undated) DEVICE — BITE BLOCK ADULT 20 X 27MM (GREEN)

## (undated) DEVICE — BASIN EMESIS 10IN GRADUATED MAUVE

## (undated) DEVICE — DRSG CURITY GAUZE SPONGE 4 X 4" 12-PLY NON-STERILE

## (undated) DEVICE — SNARE MINI-MICRO OVAL

## (undated) DEVICE — DRSG BANDAID 0.75X3"

## (undated) DEVICE — ELCTR ECG CONDUCTIVE ADHESIVE

## (undated) DEVICE — CLAMP BX HOT RAD JAW 3

## (undated) DEVICE — FOLEY HOLDER STATLOCK 2 WAY ADULT

## (undated) DEVICE — SOL INJ NS 0.9% 500ML 2 PORT

## (undated) DEVICE — MARKER ENDO SPOT EX

## (undated) DEVICE — BRUSH COLONOSCOPY CYTOLOGY